# Patient Record
Sex: FEMALE | Race: WHITE | NOT HISPANIC OR LATINO | Employment: OTHER | URBAN - METROPOLITAN AREA
[De-identification: names, ages, dates, MRNs, and addresses within clinical notes are randomized per-mention and may not be internally consistent; named-entity substitution may affect disease eponyms.]

---

## 2017-04-18 ENCOUNTER — TRANSFERRED RECORDS (OUTPATIENT)
Dept: HEALTH INFORMATION MANAGEMENT | Facility: CLINIC | Age: 32
End: 2017-04-18

## 2017-04-20 ENCOUNTER — PRE VISIT (OUTPATIENT)
Dept: MATERNAL FETAL MEDICINE | Facility: CLINIC | Age: 32
End: 2017-04-20

## 2017-04-27 ENCOUNTER — OFFICE VISIT (OUTPATIENT)
Dept: MATERNAL FETAL MEDICINE | Facility: CLINIC | Age: 32
End: 2017-04-27
Attending: OBSTETRICS & GYNECOLOGY
Payer: COMMERCIAL

## 2017-04-27 ENCOUNTER — HOSPITAL ENCOUNTER (OUTPATIENT)
Dept: ULTRASOUND IMAGING | Facility: CLINIC | Age: 32
Discharge: HOME | End: 2017-04-27
Attending: OBSTETRICS & GYNECOLOGY | Admitting: OBSTETRICS & GYNECOLOGY
Payer: COMMERCIAL

## 2017-04-27 DIAGNOSIS — O26.90 PREGNANCY RELATED CONDITION, UNSPECIFIED TRIMESTER: ICD-10-CM

## 2017-04-27 DIAGNOSIS — O09.819 PREGNANCY CONCEIVED THROUGH IN VITRO FERTILIZATION: Primary | ICD-10-CM

## 2017-04-27 PROCEDURE — 76801 OB US < 14 WKS SINGLE FETUS: CPT

## 2017-04-27 PROCEDURE — 96040 ZZH GENETIC COUNSELING, EACH 30 MINUTES: CPT | Mod: ZF | Performed by: GENETIC COUNSELOR, MS

## 2017-04-27 PROCEDURE — 76813 OB US NUCHAL MEAS 1 GEST: CPT

## 2017-04-27 NOTE — PROGRESS NOTES
[facility]  Genetic Counseling Consult    Patient: Hilda Ghosh YOB: 1985   Date of Service: 17      Hilda Ghosh was seen at [facility] [place] for genetic consultation to discuss the options for routine screening for fetal chromosome abnormalities.       Impression/Plan:   1.  Hilda had a nuchal translucency ultrasound today. The biochemical portion of the first trimester screen was not completed as the patient has already had non-invasive prenatal testing through her primary OB provider. Maternal serum AFP (single marker screen) is recommended after 15 weeks to screen for open neural tube defects. A quad screen should not be performed.    Pregnancy History:   /Parity:      Age at Delivery: 32 year old  NE: 2017, by Est. Date of Conception  Gestational Age: 12w4d    No significant complications or exposures were reported in the current pregnancy.    Hilda warner pregnancy history is significant for four miscarriages. Cyrus reported that they had a long history of infertility and opted to use donor embryos. Hilda's first three embryo transfers were from the same donors. The first of those three embryo transfers resulted in a MAB which required a D+C. Karyotype analysis on products of conception indicated trisomy 22. The two subsequent embryo transfers were chemical pregnancies. Cyrus's fourth pregnancy was conceived by intrauterine insemination but resulted in SAB.     This is Hilda's fifth pregnancy. The opted to use a donor embryo (different egg and sperm donor than the first three pregnancies). The egg donor is 34 year old Pakistani female and the sperm donor was a 53 year old  male. This was a frozen embryo transfer with a transfer date of 17. One embryo was transferred a mathias pregnancy was identified by early ultrasound.    Family History:   A family history was  not obtained as this pregnancy was conceived by a donor embryo. Hilda did not report any significant family history reported for the egg donor or sperm donor.           Risk Assessment for Chromosome Conditions:   We explained that the risk for fetal chromosome abnormalities increases with maternal age. We discussed specific features of common chromosome abnormalities, including Down syndrome, trisomy 13, trisomy 18, and sex chromosome trisomies.      At age 32 (age of egg donor), the risk to have a baby with Down syndrome at delivery is 1 in 342.     At age 32 (age of egg donor), the risk to have a baby with any chromosome abnormality at delivery is 1 in  172.     Hilda also had aneuploidy screening earlier in pregnancy.  Non-invasive Prenatal Testing (NIPT)    Maternal plasma cell-free DNA testing    Screens for fetal trisomy 21, trisomy 13, trisomy 18, and sex chromosome aneuploidy    Hilda had an InformaSeq test earlier in pregnancy; we reviewed the results today, which are normal for chromosome 13, chromosome 18 and chromosome 21 (no aneuploidy detected)    Given the accuracy of this test, these results greatly decrease the chance for certain fetal chromosome abnormalities    We discussed the limitations of normal NIPT results      Testing Options:   We discussed the following options:   First trimester screening    First trimester ultrasound with nuchal translucency and nasal bone assessments, maternal plasma hCG, FAN-A, and AFP measurement    Screens for fetal trisomy 21, trisomy 13, and trisomy 18    Cannot screen for open neural tube defects; maternal serum AFP after 15 weeks is recommended    Hilda had a first trimester NT ultrasound only today. The biochemical portion of the first trimester screen was not performed since she had non invasive prenatal testing through her primary OB provider.     Chorionic villus sampling (CVS)    Invasive procedure typically performed in the first trimester by which  placental villi are obtained for the purpose of chromosome analysis and/or other prenatal genetic analysis    Diagnostic results; >99% sensitivity for fetal chromosome abnormalities    Cannot test for open neural tube defects; maternal serum AFP after 15 weeks is recommended     Genetic Amniocentesis    Invasive procedure typically performed in the second trimester by which amniotic fluid is obtained for the purpose of chromosome analysis and/or other prenatal genetic analysis    Diagnostic results; >99% sensitivity for fetal chromosome abnormalities    AFAFP measurement tests for open neural tube defects     Comprehensive (Level II) ultrasound: Detailed ultrasound performed between 18-22 weeks gestation to screen for major birth defects and markers for aneuploidy.    We reviewed the benefits and limitations of this testing.  Screening tests provide a risk assessment specific to the pregnancy for certain fetal chromosome abnormalities, but cannot definitively diagnose or exclude a fetal chromosome abnormality.  Follow-up genetic counseling and consideration of diagnostic testing is recommended with any abnormal screening result.      It was a pleasure to be involved with Hilda timmy hector. Face-to-face time of the meeting was 30 minutes.    Skye Morales MS, [facility]  Maternal Fetal Medicine  [facility]  Phone:[phone]  Email: [email or web address]

## 2017-04-27 NOTE — MR AVS SNAPSHOT
After Visit Summary   4/27/2017    Hilda Ghosh    MRN: [id number]           Patient Information     Date Of Birth          1985        Visit Information        Provider Department      4/27/2017 2:45 PM Dee Stewart,  [facility]etal [facility]        Today's Diagnoses     Pregnancy conceived through in vitro fertilization    -  1       Follow-ups after your visit        Your next 10 appointments already scheduled     Jun 08, 2017  2:15 PM CDT   MFM US COMP with SHMFMUSR1   [facility])    [address] [place] [address]   [phone]           Wear comfortable clothes and leave your valuables at home.            Jun 08, 2017  2:45 PM CDT   Radiology MD with SH SHELLEY SULLIVAN   [facility] Maternal Fetal Medicine [facility])    [address] [place] [address] [phone]           Please arrive at the time given for your first appointment.  This visit is used internally to schedule the physician's time during your ultrasound.            Jun 29, 2017  2:15 PM CDT   M READ SCREEN FETAL EHCO Shen with SHMFMUSR3   [facility] - [facility] ([facility])    [address] [place] [id number] [phone]            Jun 29, 2017  2:45 PM CDT   Radiology MD with SH SHELLEY SULLIVAN   [facility]etal [facility] ([facility])    [address] [place] [id number] [phone]           Please arrive at the time given for your first appointment.  This visit is used internally to schedule the physician's time during your ultrasound.              Future tests that were ordered for you today     Open Future Orders        Priority Expected Expires Ordered    MFM Read Screen Fetal Echo Single Routine  2/27/2018  "2017            Who to contact     If you have questions or need follow up information about today's clinic visit or your schedule please contact [facility] MATERNAL FETAL MEDICINE [facility] directly at [phone].  Normal or non-critical lab and imaging results will be communicated to you by MyChart, letter or phone within 4 business days after the clinic has received the results. If you do not hear from us within 7 days, please contact the clinic through MyChart or phone. If you have a critical or abnormal lab result, we will notify you by phone as soon as possible.  Submit refill requests through [employer] or call your pharmacy and they will forward the refill request to us. Please allow 3 business days for your refill to be completed.          Additional Information About Your Visit        [employer] Information     [employer] lets you send messages to your doctor, view your test results, renew your prescriptions, schedule appointments and more. To sign up, go to [email or web address] . Click on \"Log in\" on the left side of the screen, which will take you to the Welcome page. Then click on \"Sign up Now\" on the right side of the page.     You will be asked to enter the access code listed below, as well as some personal information. Please follow the directions to create your username and password.     Your access code is: [email or web address]  Expires: 2017  4:21 PM     Your access code will  in 90 days. If you need help or a new code, please call your [place] clinic or [phone].        Care EveryWhere ID     This is your Care EveryWhere ID. This could be used by other organizations to access your [place] medical records  [id number]        Your Vitals Were     Last Period                   2017            Blood Pressure from Last 3 Encounters:   16 144/84    Weight from Last 3 Encounters:   16 103.1 kg (227 lb 6.4 oz)               Primary Care Provider Office Phone # Fax #    Osmany " Bi Ledezma [phone]       OB GYN INFERTILITY [address]  [facility] [address]        Thank you!     Thank you for choosing [facility]  for your care. Our goal is always to provide you with excellent care. Hearing back from our patients is one way we can continue to improve our services. Please take a few minutes to complete the written survey that you may receive in the mail after your visit with us. Thank you!             Your Updated Medication List - Protect others around you: Learn how to safely use, store and throw away your medicines at www.disposemymeds.org.          This list is accurate as of: 4/27/17 11:59 PM.  Always use your most recent med list.                   Brand Name Dispense Instructions for use    ADDERALL XR PO      Take 15 mg by mouth daily       CITALOPRAM HYDROBROMIDE PO      Take 40 mg by mouth daily       norethindrone-ethinyl estradiol 0.4-35 MG-MCG per tablet    OVCON    84 tablet    Take 1 tablet by mouth daily       prenatal multivitamin  plus iron 27-0.8 MG Tabs per tablet      Take 1 tablet by mouth daily       traMADol 50 MG tablet    ULTRAM    20 tablet    Take 1-2 tablets ( mg) by mouth every 6 hours as needed for pain maximum 8 tablet(s) per day

## 2017-04-27 NOTE — MR AVS SNAPSHOT
After Visit Summary   4/27/2017    Hilda Ghosh    MRN: [id number]           Patient Information     Date Of Birth          1985        Visit Information        Provider Department      4/27/2017 1:30 PM Skye Morales [facility] Maternal Fetal Medicine [facility]        Today's Diagnoses     Pregnancy related condition, unspecified trimester           Follow-ups after your visit        Your next 10 appointments already scheduled     Jun 08, 2017  2:15 PM CDT   MFM US COMP with SHMFMUSR1   [facility] Maternal Fetal Medicine Ultrasound - [facility] ([facility])    [address] [place] [address]   [phone]           Wear comfortable clothes and leave your valuables at home.            Jun 08, 2017  2:45 PM CDT   Radiology MD with SH SHELLEY SULLIVAN   [facility] Maternal Fetal Medicine [facility] ([facility])    [address] [place] [address] [phone]           Please arrive at the time given for your first appointment.  This visit is used internally to schedule the physician's time during your ultrasound.            Jun 29, 2017  2:15 PM CDT   M READ SCREEN FETAL EHCO Shen with SHMFMUSR3   [facility] Maternal Fetal Medicine Ultrasound - [facility] ([facility])    [address] [place] [address] [phone]            Jun 29, 2017  2:45 PM CDT   Radiology MD with SH SHELLEY SULLIVAN   [facility] Maternal Fetal Medicine [facility] ([facility])    [address] [place] [address] [phone]           Please arrive at the time given for your first appointment.  This visit is used internally to schedule the physician's time during your ultrasound.              Future tests that were ordered for you today     Open Future Orders        Priority Expected Expires Ordered    MFM Read Screen Fetal Echo Single Routine  2/27/2018 4/27/2017        "     Who to contact     If you have questions or need follow up information about today's clinic visit or your schedule please contact [facility] MATERNAL FETAL MEDICINE [facility] directly at [phone].  Normal or non-critical lab and imaging results will be communicated to you by MyChart, letter or phone within 4 business days after the clinic has received the results. If you do not hear from us within 7 days, please contact the clinic through MyChart or phone. If you have a critical or abnormal lab result, we will notify you by phone as soon as possible.  Submit refill requests through [employer] or call your pharmacy and they will forward the refill request to us. Please allow 3 business days for your refill to be completed.          Additional Information About Your Visit        [employer] [facility] Hospital[employer] [employer] Information     [employer] lets you send messages to your doctor, view your test results, renew your prescriptions, schedule appointments and more. To sign up, go to [email or web address] . Click on \"Log in\" on the left side of the screen, which will take you to the Welcome page. Then click on \"Sign up Now\" on the right side of the page.     You will be asked to enter the access code listed below, as well as some personal information. Please follow the directions to create your username and password.     Your access code is: [email or web address]  Expires: 2017  4:21 PM     Your access code will  in 90 days. If you need help or a new code, please call your [place] clinic or [phone].        Care EveryWhere ID     This is your Care EveryWhere ID. This could be used by other organizations to access your [place] medical records  [id number]        Your Vitals Were     Last Period                   2017            Blood Pressure from Last 3 Encounters:   16 144/84    Weight from Last 3 Encounters:   16 103.1 kg (227 lb 6.4 oz)              We Performed the Following     [facility] Genetic Counseling        Primary " Care Provider Office Phone # Fax #    Osmany Ledezma [phone]       OB GYN INFERTILITY [address]  [facility] [address]        Thank you!     Thank you for choosing [facility]  for your care. Our goal is always to provide you with excellent care. Hearing back from our patients is one way we can continue to improve our services. Please take a few minutes to complete the written survey that you may receive in the mail after your visit with us. Thank you!             Your Updated Medication List - Protect others around you: Learn how to safely use, store and throw away your medicines at www.disposemymeds.org.          This list is accurate as of: 4/27/17  4:21 PM.  Always use your most recent med list.                   Brand Name Dispense Instructions for use    ADDERALL XR PO      Take 15 mg by mouth daily       CITALOPRAM HYDROBROMIDE PO      Take 40 mg by mouth daily       norethindrone-ethinyl estradiol 0.4-35 MG-MCG per tablet    OVCON    84 tablet    Take 1 tablet by mouth daily       prenatal multivitamin  plus iron 27-0.8 MG Tabs per tablet      Take 1 tablet by mouth daily       traMADol 50 MG tablet    ULTRAM    20 tablet    Take 1-2 tablets ( mg) by mouth every 6 hours as needed for pain maximum 8 tablet(s) per day

## 2017-04-28 NOTE — PROGRESS NOTES
"Please see \"Imaging\" tab under \"Chart Review\" for details of today's US.    Dee Stewart, DO    "

## 2017-06-08 ENCOUNTER — HOSPITAL ENCOUNTER (OUTPATIENT)
Dept: ULTRASOUND IMAGING | Facility: CLINIC | Age: 32
Discharge: HOME | End: 2017-06-08
Attending: OBSTETRICS & GYNECOLOGY | Admitting: OBSTETRICS & GYNECOLOGY
Payer: COMMERCIAL

## 2017-06-08 ENCOUNTER — OFFICE VISIT (OUTPATIENT)
Dept: MATERNAL FETAL MEDICINE | Facility: CLINIC | Age: 32
End: 2017-06-08
Attending: OBSTETRICS & GYNECOLOGY
Payer: COMMERCIAL

## 2017-06-08 DIAGNOSIS — O09.819 PREGNANCY CONCEIVED THROUGH IN VITRO FERTILIZATION: Primary | ICD-10-CM

## 2017-06-08 DIAGNOSIS — O26.90 PREGNANCY RELATED CONDITION, UNSPECIFIED TRIMESTER: ICD-10-CM

## 2017-06-08 PROCEDURE — 76811 OB US DETAILED SNGL FETUS: CPT

## 2017-06-08 NOTE — MR AVS SNAPSHOT
After Visit Summary   6/8/2017    Hilda Ghosh    MRN: [id number]           Patient Information     Date Of Birth          1985        Visit Information        Provider Department      6/8/2017 2:45 PM Dee Stewart,  [facility] Maternal Fetal Medicine [facility]        Today's Diagnoses     Pregnancy conceived through in vitro fertilization    -  1       Follow-ups after your visit        Your next 10 appointments already scheduled     Jun 29, 2017  1:30 PM CDT   MFM US COMPRE SINGLE F/U with SHMFMUSR3   [facility] Maternal Fetal Medicine Ultrasound - [facility] ([facility])    [address] [place] [address] [address]  [facility] [address] [phone]           Wear comfortable clothes and leave your valuables at home.            Jun 29, 2017  2:15 PM CDT   MFM READ SCREEN FETAL EHCO Shen with SHMFMUSR3   [facility] Maternal Fetal Medicine Ultrasound - [facility] ([facility])    [address] [place] [address] [address]  [place] [address] [phone]            Jun 29, 2017  2:45 PM CDT   Radiology MD with  MFM MD   [facility] Maternal Fetal Medicine [facility] ([facility])    [address] [place] [address] [address]  [place] [id number] [phone]           Please arrive at the time given for your first appointment.  This visit is used internally to schedule the physician's time during your ultrasound.              Future tests that were ordered for you today     Open Future Orders        Priority Expected Expires Ordered    MFM US Comprehensive Single F/U Routine 6/29/2017 6/8/2018 6/8/2017            Who to contact     If you have questions or need follow up information about today's clinic visit or your schedule please contact [facility] MATERNAL FETAL MEDIC[facility] directly at [phone].  Normal or non-critical lab and imaging results will be communicated to you by MyChart, letter or phone within 4 business days after the clinic has  "received the results. If you do not hear from us within 7 days, please contact the clinic through [employer] or phone. If you have a critical or abnormal lab result, we will notify you by phone as soon as possible.  Submit refill requests through [employer] or call your pharmacy and they will forward the refill request to us. Please allow 3 business days for your refill to be completed.          Additional Information About Your Visit        [employer] Information     [employer] lets you send messages to your doctor, view your test results, renew your prescriptions, schedule appointments and more. To sign up, go to [email or web address] . Click on \"Log in\" on the left side of the screen, which will take you to the Welcome page. Then click on \"Sign up Now\" on the right side of the page.     You will be asked to enter the access code listed below, as well as some personal information. Please follow the directions to create your username and password.     Your access code is: [email or web address]  Expires: 2017  4:21 PM     Your access code will  in 90 days. If you need help or a new code, please call your [place] clinic or [phone].        Care EveryWhere ID     This is your Care EveryWhere ID. This could be used by other organizations to access your [place] medical records  [id number]        Your Vitals Were     Last Period                   2017            Blood Pressure from Last 3 Encounters:   16 144/84    Weight from Last 3 Encounters:   16 103.1 kg (227 lb 6.4 oz)               Primary Care Provider Office Phone # Fax #    Osmany Ledezma [phone]       OB GYN INFERTILITY [address]  [facility] [address]        Thank you!     Thank you for choosing [facility]  for your care. Our goal is always to provide you with excellent care. Hearing back from our patients is one way we can continue to improve our services. Please take a few " minutes to complete the written survey that you may receive in the mail after your visit with us. Thank you!             Your Updated Medication List - Protect others around you: Learn how to safely use, store and throw away your medicines at www.disposemymeds.org.          This list is accurate as of: 6/8/17  3:15 PM.  Always use your most recent med list.                   Brand Name Dispense Instructions for use    ADDERALL XR PO      Take 15 mg by mouth daily       CITALOPRAM HYDROBROMIDE PO      Take 40 mg by mouth daily       norethindrone-ethinyl estradiol 0.4-35 MG-MCG per tablet    OVCON    84 tablet    Take 1 tablet by mouth daily       prenatal multivitamin  plus iron 27-0.8 MG Tabs per tablet      Take 1 tablet by mouth daily       traMADol 50 MG tablet    ULTRAM    20 tablet    Take 1-2 tablets ( mg) by mouth every 6 hours as needed for pain maximum 8 tablet(s) per day

## 2017-06-29 ENCOUNTER — HOSPITAL ENCOUNTER (OUTPATIENT)
Dept: ULTRASOUND IMAGING | Facility: CLINIC | Age: 32
Discharge: HOME | End: 2017-06-29
Attending: OBSTETRICS & GYNECOLOGY | Admitting: OBSTETRICS & GYNECOLOGY
Payer: COMMERCIAL

## 2017-06-29 ENCOUNTER — HOSPITAL ENCOUNTER (OUTPATIENT)
Dept: ULTRASOUND IMAGING | Facility: CLINIC | Age: 32
End: 2017-06-29
Attending: OBSTETRICS & GYNECOLOGY
Payer: COMMERCIAL

## 2017-06-29 ENCOUNTER — OFFICE VISIT (OUTPATIENT)
Dept: MATERNAL FETAL MEDICINE | Facility: CLINIC | Age: 32
End: 2017-06-29
Attending: OBSTETRICS & GYNECOLOGY
Payer: COMMERCIAL

## 2017-06-29 DIAGNOSIS — O09.819 PREGNANCY CONCEIVED THROUGH IN VITRO FERTILIZATION: ICD-10-CM

## 2017-06-29 DIAGNOSIS — O09.819 PREGNANCY CONCEIVED THROUGH IN VITRO FERTILIZATION: Primary | ICD-10-CM

## 2017-06-29 PROCEDURE — 76825 ECHO EXAM OF FETAL HEART: CPT

## 2017-06-29 PROCEDURE — 76816 OB US FOLLOW-UP PER FETUS: CPT

## 2017-06-29 NOTE — MR AVS SNAPSHOT
"              After Visit Summary   2017    Hilda Ghosh    MRN: [id number]           Patient Information     Date Of Birth          1985        Visit Information        Provider Department      2017 2:45 PM Dee Stewart, DO [facility] Maternal Fetal Medicine [facility]        Today's Diagnoses     Pregnancy conceived through in vitro fertilization    -  1       Follow-ups after your visit        Who to contact     If you have questions or need follow up information about today's clinic visit or your schedule please contact [facility] MATERNAL FETAL MEDICINE [facility] directly at [phone].  Normal or non-critical lab and imaging results will be communicated to you by PonoMusichart, letter or phone within 4 business days after the clinic has received the results. If you do not hear from us within 7 days, please contact the clinic through Chatalogt or phone. If you have a critical or abnormal lab result, we will notify you by phone as soon as possible.  Submit refill requests through gdgt or call your pharmacy and they will forward the refill request to us. Please allow 3 business days for your refill to be completed.          Additional Information About Your Visit        MyChart Information     gdgt lets you send messages to your doctor, view your test results, renew your prescriptions, schedule appointments and more. To sign up, go to [email or web address] . Click on \"Log in\" on the left side of the screen, which will take you to the Welcome page. Then click on \"Sign up Now\" on the right side of the page.     You will be asked to enter the access code listed below, as well as some personal information. Please follow the directions to create your username and password.     Your access code is: [email or web address]  Expires: 2017  4:21 PM     Your access code will  in 90 days. If you need help or a new code, please call your [place] clinic or [phone].        Care EveryWhere ID     " This is your Care EveryWhere ID. This could be used by other organizations to access your [facility] medical records  [id number]        Your Vitals Were     Last Period                   01/29/2017            Blood Pressure from Last 3 Encounters:   11/23/16 144/84    Weight from Last 3 Encounters:   11/23/16 103.1 kg (227 lb 6.4 oz)              Today, you had the following     No orders found for display       Primary Care Provider Office Phone # Fax #    Osmany Ledezma [phone]       [facility] [address]  [place] [address]        Equal Access to Services     [facility]: Hadii aad ku hadasho Soomaali, waaxda luqadaha, qaybta kaalmada adeegyada, evelyn ribera haykit guillen . So [facility] [phone].    ATENCIÓN: Si habla español, tiene a sommers disposición servicios gratuitos de asistencia lingüística. Llame al [phone].    We comply with applicable federal civil rights laws and [place] laws. We do not discriminate on the basis of race, color, national origin, age, disability sex, sexual orientation or gender identity.            Thank you!     Thank you for choosing [facility]ERNAL FETAL MEDIC[facility]  for your care. Our goal is always to provide you with excellent care. Hearing back from our patients is one way we can continue to improve our services. Please take a few minutes to complete the written survey that you may receive in the mail after your visit with us. Thank you!             Your Updated Medication List - Protect others around you: Learn how to safely use, store and throw away your medicines at www.disposemymeds.org.          This list is accurate as of: 6/29/17  4:54 PM.  Always use your most recent med list.                   Brand Name Dispense Instructions for use Diagnosis    ADDERALL XR PO      Take 15 mg by mouth daily        CITALOPRAM HYDROBROMIDE PO      Take 40 mg by mouth daily        norethindrone-ethinyl estradiol 0.4-35  MG-MCG per tablet    OVCON    84 tablet    Take 1 tablet by mouth daily    Spontaneous miscarriage       prenatal multivitamin  plus iron 27-0.8 MG Tabs per tablet      Take 1 tablet by mouth daily        traMADol 50 MG tablet    ULTRAM    20 tablet    Take 1-2 tablets ( mg) by mouth every 6 hours as needed for pain maximum 8 tablet(s) per day    Acute post-operative pain

## 2017-09-17 ENCOUNTER — HEALTH MAINTENANCE LETTER (OUTPATIENT)
Age: 32
End: 2017-09-17

## 2018-11-18 ENCOUNTER — HOSPITAL ENCOUNTER (EMERGENCY)
Facility: CLINIC | Age: 33
Discharge: HOME | End: 2018-11-18
Attending: EMERGENCY MEDICINE | Admitting: EMERGENCY MEDICINE
Payer: COMMERCIAL

## 2018-11-18 VITALS
BODY MASS INDEX: 31.1 KG/M2 | SYSTOLIC BLOOD PRESSURE: 147 MMHG | TEMPERATURE: 97.6 F | WEIGHT: 210 LBS | OXYGEN SATURATION: 99 % | HEIGHT: 69 IN | DIASTOLIC BLOOD PRESSURE: 93 MMHG | RESPIRATION RATE: 16 BRPM

## 2018-11-18 DIAGNOSIS — R11.2 NON-INTRACTABLE VOMITING WITH NAUSEA, UNSPECIFIED VOMITING TYPE: ICD-10-CM

## 2018-11-18 LAB
ANION GAP SERPL CALCULATED.3IONS-SCNC: 8 MMOL/L (ref 3–14)
BUN SERPL-MCNC: 18 MG/DL (ref 7–30)
CALCIUM SERPL-MCNC: 9.2 MG/DL (ref 8.5–10.1)
CHLORIDE SERPL-SCNC: 105 MMOL/L (ref 94–109)
CO2 SERPL-SCNC: 24 MMOL/L (ref 20–32)
CREAT SERPL-MCNC: 0.56 MG/DL (ref 0.52–1.04)
GFR SERPL CREATININE-BSD FRML MDRD: >90 ML/MIN/1.7M2
GLUCOSE SERPL-MCNC: 142 MG/DL (ref 70–99)
HCG SERPL QL: NEGATIVE
MAGNESIUM SERPL-MCNC: 1.8 MG/DL (ref 1.6–2.3)
POTASSIUM SERPL-SCNC: 3.9 MMOL/L (ref 3.4–5.3)
SODIUM SERPL-SCNC: 137 MMOL/L (ref 133–144)

## 2018-11-18 PROCEDURE — 99284 EMERGENCY DEPT VISIT MOD MDM: CPT | Mod: 25

## 2018-11-18 PROCEDURE — 80048 BASIC METABOLIC PNL TOTAL CA: CPT | Performed by: EMERGENCY MEDICINE

## 2018-11-18 PROCEDURE — 83735 ASSAY OF MAGNESIUM: CPT | Performed by: EMERGENCY MEDICINE

## 2018-11-18 PROCEDURE — 96374 THER/PROPH/DIAG INJ IV PUSH: CPT

## 2018-11-18 PROCEDURE — 84703 CHORIONIC GONADOTROPIN ASSAY: CPT | Performed by: EMERGENCY MEDICINE

## 2018-11-18 PROCEDURE — 96361 HYDRATE IV INFUSION ADD-ON: CPT

## 2018-11-18 PROCEDURE — 25000128 H RX IP 250 OP 636: Performed by: EMERGENCY MEDICINE

## 2018-11-18 RX ORDER — ONDANSETRON 2 MG/ML
4 INJECTION INTRAMUSCULAR; INTRAVENOUS ONCE
Status: COMPLETED | OUTPATIENT
Start: 2018-11-18 | End: 2018-11-18

## 2018-11-18 RX ORDER — ONDANSETRON 4 MG/1
4 TABLET, ORALLY DISINTEGRATING ORAL EVERY 8 HOURS PRN
Qty: 10 TABLET | Refills: 0 | Status: SHIPPED | OUTPATIENT
Start: 2018-11-18 | End: 2018-11-21

## 2018-11-18 RX ADMIN — ONDANSETRON 4 MG: 2 INJECTION INTRAMUSCULAR; INTRAVENOUS at 14:51

## 2018-11-18 RX ADMIN — SODIUM CHLORIDE 1000 ML: 9 INJECTION, SOLUTION INTRAVENOUS at 14:51

## 2018-11-18 ASSESSMENT — ENCOUNTER SYMPTOMS
CONSTIPATION: 0
VOMITING: 1
DYSURIA: 0
CHILLS: 0
DIARRHEA: 1
ABDOMINAL PAIN: 1
BACK PAIN: 0
NAUSEA: 1
FEVER: 0

## 2018-11-18 NOTE — ED AVS SNAPSHOT
[facility] Emergency Department    [address]    [facility] [address]    Phone:  [phone]    Fax:  [phone]                                       Hilda Ghosh   MRN: [id number]    Department:  [facility] Emergency Department   Date of Visit:  11/18/2018           After Visit Summary Signature Page     I have received my discharge instructions, and my questions have been answered. I have discussed any challenges I see with this plan with the nurse or doctor.    ..........................................................................................................................................  Patient/Patient Representative Signature      ..........................................................................................................................................  Patient Representative Print Name and Relationship to Patient    ..................................................               ................................................  Date                                   Time    ..........................................................................................................................................  Reviewed by Signature/Title    ...................................................              ..............................................  Date                                               Time          22EPIC Rev 08/18

## 2018-11-18 NOTE — ED TRIAGE NOTES
Pt presents for complaint of 24 hours of nausea, vomiting and diarrhea. States she has been having some abdominal cramping with this. Denies anybody else at home being sick. ABC intact, A&Ox4.

## 2018-11-18 NOTE — ED PROVIDER NOTES
"  History     Chief Complaint:  \"I have been vomiting for the past 24 hours.\"    HPI   Hilda Ghosh is a 33 year old female who presents with nausea and vomiting. The patient states that yesterday she enjoyed a \"friendsgiving\" with several close friends and her significant other. They all ate similar foods but around 24 hour ago the patient reports that she had a fairly sudden onset of nausea and vomiting followed by diffuse abdominal cramping. She has no outright diarrhea but has some looser stools. She has been unable to keep anything down at home due to symptoms and presents here for evaluation. The patient denies abdominal pain preceding her nausea and vomiting. She denies fevers, chills, bloody stools, urinary symptoms, flank pain.    LMP: 3 weeks ago    Allergies:  No known drug allergies     Medications:    Lamictal  Propranolol  Adderall     Past Medical History:    Anxiety  Depression   Fibroid    Past Surgical History:      D&C   Left finger reattachment surgery    Family History:    History reviewed. No pertinent family history.      Social History:  Smoking Status: Former Smoker  Alcohol Use: Yes  Marital Status:        Review of Systems   Constitutional: Negative for chills and fever.   Gastrointestinal: Positive for abdominal pain, diarrhea (looser stools), nausea and vomiting. Negative for constipation.   Genitourinary: Negative for dysuria, vaginal bleeding and vaginal discharge.   Musculoskeletal: Negative for back pain.   All other systems reviewed and are negative.      Physical Exam   First Vitals:  BP: (!) 167/106  Heart Rate: 83  Temp: 97.6  F (36.4  C)  Resp: 16  Height: 175.3 cm (5' 9\")  Weight: 95.3 kg (210 lb)  SpO2: 98 %    Physical Exam  Nursing note and vitals reviewed.  Constitutional: Well nourished. Holding emesis bag at bedside.    Eyes: Conjunctiva normal.  Pupils are equal, round, and reactive to light.   ENT: Nose normal. Mucous membranes pink and moist. Posterior " oropharynx normal.    Neck: Normal range of motion.  CVS: Normal rate, regular rhythm.  Normal heart sounds.  No murmur.  Pulmonary: Lungs clear to auscultation bilaterally. No wheezes/rales/rhonchi.  GI: Abdomen soft. Nontender, nondistended. No rigidity or guarding.    MSK: No calf tenderness or swelling.  Neuro: Alert. Follows simple commands.  Skin: Skin is warm and dry. No rash noted.   Psychiatric: Normal affect.       Emergency Department Course     Laboratory:  BMP: Glucose 142 (H), otherwise WNL (Creatinine 0.56)   HCG: Negative  Magnesium: 1.8     Interventions:  1451 - Zofran 4mg IV injection    1451 - NS 1L IV Bolus     Emergency Department Course:  Past medical records, nursing notes, and vitals reviewed.  1426: I performed an exam of the patient and obtained history, as documented above.     An IV was inserted to administer the above interventions.      1553: I rechecked the patient. Findings and plan explained to the Patient. Patient discharged home with instructions regarding supportive care, medications, and reasons to return. The importance of close follow-up was reviewed.      Impression & Plan      Medical Decision Making:  Hilda Ghosh is a 33 year old female who presents for evaluation of nausea, vomiting and diarrhea with minimal abdominal pain in a nonfocal abdominal exam. The differential diagnosis of vomiting and diarrhea is broad and includes such etiologies as viral gastroenteritis, bacterial infection of the large intestine (salmonella, shigella, campylobacter, E. coli, etc), bowel obstruction, intra-abdominal infection such as colitis, cholecystitis, UTI, pyelonephritis, appendicitis, etc.  There are no signs of worrisome intra-abdominal pathologies detected during the visit today.  The patient is not pregnant.  The patient has a benign abdominal exam without rebound, guarding, or marked tenderness to palpation.  Laboratory studies were unremarkable.  The patient improved with IV fluids  and anti-emetics; tolerating PO challenge. Supportive outpatient management seems appropriate at this time.  Abdominal pain precautions given. No indication for stool studies or CT at this time.  It was discussed with the patient to return to the ED for blood in stool, increasing pain, or fevers more than 102F.        Diagnosis:    ICD-10-CM    1. Non-intractable vomiting with nausea, unspecified vomiting type R11.2      Discharge Medications:  New Prescriptions    ONDANSETRON (ZOFRAN ODT) 4 MG ODT TAB    Take 1 tablet (4 mg) by mouth every 8 hours as needed for nausea       Clinton Mcadams  11/18/2018   [facility] EMERGENCY DEPARTMENT  IClinton, malaika serving as a scribe at 2:27 PM on 11/18/2018 to document services personally performed by Milagros Leone MD based on my observations and the provider's statements to me.       Milagros Leone MD  11/18/18 1603

## 2018-11-18 NOTE — ED AVS SNAPSHOT
[facility] Emergency Department    [address] [place] [address]    Phone:  [phone]    Fax:  [phone]                                       Hilda Ghosh   MRN: [id number]    Department:  [facility] Emergency Department   Date of Visit:  11/18/2018           Patient Information     Date Of Birth          1985        Your diagnoses for this visit were:     Non-intractable vomiting with nausea, unspecified vomiting type        You were seen by Milagros Leone MD.      Follow-up Information     Follow up with Angelica Rubin MD. Schedule an appointment as soon as possible for a visit in 2 days.    Specialty:  OB/Gyn    Contact information:    [facility]  [address] [place] [address]  [phone]          Discharge Instructions       Discharge Instructions  Vomiting    You have been seen today for vomiting (throwing up). This is usually caused by a virus, but some bacteria, parasites, medicines or other medical conditions can cause similar symptoms. At this time your provider does not find that your vomiting is a sign of anything dangerous or life-threatening. However, sometimes the signs of serious illness do not show up right away. If you have new or worse symptoms, you may need to be seen again in the Emergency Department or by your primary provider. Remember that serious problems like appendicitis can start as vomiting.    Generally, every Emergency Department visit should have a follow-up clinic visit with either a primary or a specialty clinic/provider. Please follow-up as instructed by your emergency provider today.    Return to the Emergency Department if:    You keep vomiting and you are not able to keep liquids down.     You feel you are getting dehydrated, such as being very thirsty, not urinating (peeing) at least every 8-12 hours, or feeling faint or lightheaded.     You develop a new fever, or your fever continues  for more than 2 days.     You have abdominal (belly pain) that seems worse than cramps, is in one spot, or is getting worse over time. Appendicitis usually causes pain in the right lower abdomen (to the right and below your belly button) so watch for pain in this location.    You have blood in your vomit or stools.     You feel very weak.    You are not starting to improve within 24 hours of your visit here.     What can I do to help myself?    The most important thing to do is to drink clear liquids. If you have been vomiting a lot, it is best to have only small, frequent sips of liquids. Drinking too much at once may cause more vomiting. If you are vomiting often, you must replace minerals, sodium and potassium lost with your illness. Pedialyte  is the best available rehydration liquid but some find that it doesn t taste good so sports drinks are an alterative. You can also drink clear liquids such as water, weak tea, apple juice, and 7-Up . Avoid acid liquids (orange), caffeine (coffee) or alcohol. Do not drink milk until you no longer have diarrhea (loose stools).     After liquids are staying down, you may start eating mild foods. Soda crackers, toast, plain noodles, gelatin, applesauce and bananas are good first choices. Avoid foods that have acid, are spicy, fatty or have a lot of fiber (such as meats, coarse grains, vegetables). You may start eating these foods again in about 3 days when you are better.     Sometimes treatment includes prescription medicine to prevent nausea (sick to your stomach) and vomiting. If your provider prescribes these for you, take them as directed.     Do not take ibuprofen, naproxen, or other nonsteroidal anti-inflammatory (NSAID) medicines without checking with your healthcare provider.     If you were given a prescription for medicine here today, be sure to read all of the information (including the package insert) that comes with your prescription.  This will include important  information about the medicine, its side effects, and any warnings that you need to know about.  The pharmacist who fills the prescription can provide more information and answer questions you may have about the medicine.  If you have questions or concerns that the pharmacist cannot address, please call or return to the Emergency Department.     Remember that you can always come back to the Emergency Department if you are not able to see your regular provider in the amount of time listed above, if you get any new symptoms, or if there is anything that worries you.      24 Hour Appointment Hotline       To make an appointment at any [facility]3], call [phone] ([phone]). If you don't have a family doctor or clinic, we will help you find one. [facility] clinics are conveniently located to serve the needs of you and your family.             Review of your medicines      START taking        Dose / Directions Last dose taken    ondansetron 4 MG ODT tab   Commonly known as:  ZOFRAN ODT   Dose:  4 mg   Quantity:  10 tablet        Take 1 tablet (4 mg) by mouth every 8 hours as needed for nausea   Refills:  0          Our records show that you are taking the medicines listed below. If these are incorrect, please call your family doctor or clinic.        Dose / Directions Last dose taken    ADDERALL XR PO   Dose:  15 mg        Take 15 mg by mouth daily   Refills:  0        LAMICTAL PO   Dose:  200 mg        Take 200 mg by mouth daily   Refills:  0        PROPRANOLOL HCL PO        Refills:  0                Prescriptions were sent or printed at these locations (1 Prescription)                   Other Prescriptions                Printed at Department/Unit printer (1 of 1)         ondansetron (ZOFRAN ODT) 4 MG ODT tab                Procedures and tests performed during your visit     Basic metabolic panel    HCG QUALitative pregnancy (blood)    Magnesium      Orders Needing Specimen Collection     None       Pending Results     No orders found from 11/16/2018 to 11/19/2018.            Pending Culture Results     No orders found from 11/16/2018 to 11/19/2018.            Pending Results Instructions     If you had any lab results that were not finalized at the time of your Discharge, you can call the ED Lab Result RN at [phone]. You will be contacted by this team for any positive Lab results or changes in treatment. The nurses are available 7 days a week from 10A to 6:30P.  You can leave a message 24 hours per day and they will return your call.        Test Results From Your Hospital Stay        11/18/2018  3:27 PM      Component Results     Component Value Ref Range & Units Status    Sodium 137 133 - 144 mmol/L Final    Potassium 3.9 3.4 - 5.3 mmol/L Final    Chloride 105 94 - 109 mmol/L Final    Carbon Dioxide 24 20 - 32 mmol/L Final    Anion Gap 8 3 - 14 mmol/L Final    Glucose 142 (H) 70 - 99 mg/dL Final    Urea Nitrogen 18 7 - 30 mg/dL Final    Creatinine 0.56 0.52 - 1.04 mg/dL Final    GFR Estimate >90 >60 mL/min/1.7m2 Final    Non  GFR Calc    GFR Estimate If Black >90 >60 mL/min/1.7m2 Final    African American GFR Calc    Calcium 9.2 8.5 - 10.1 mg/dL Final         11/18/2018  3:38 PM      Component Results     Component Value Ref Range & Units Status    HCG Qualitative Serum Negative NEG^Negative Final    This test is for screening purposes.  Results should be interpreted along with   the clinical picture.  Confirmation testing is available if warranted by   ordering RMM160, HCG Quantitative Pregnancy.           11/18/2018  3:27 PM      Component Results     Component Value Ref Range & Units Status    Magnesium 1.8 1.6 - 2.3 mg/dL Final                Clinical Quality Measure: Blood Pressure Screening     Your blood pressure was checked while you were in the emergency department today. The last reading we obtained was  BP: (!) 147/93 . Please read the guidelines below about what these  "numbers mean and what you should do about them.  If your systolic blood pressure (the top number) is less than 120 and your diastolic blood pressure (the bottom number) is less than 80, then your blood pressure is normal. There is nothing more that you need to do about it.  If your systolic blood pressure (the top number) is 120-139 or your diastolic blood pressure (the bottom number) is 80-89, your blood pressure may be higher than it should be. You should have your blood pressure rechecked within a year by a primary care provider.  If your systolic blood pressure (the top number) is 140 or greater or your diastolic blood pressure (the bottom number) is 90 or greater, you may have high blood pressure. High blood pressure is treatable, but if left untreated over time it can put you at risk for heart attack, stroke, or kidney failure. You should have your blood pressure rechecked by a primary care provider within the next 4 weeks.  If your provider in the emergency department today gave you specific instructions to follow-up with your doctor or provider even sooner than that, you should follow that instruction and not wait for up to 4 weeks for your follow-up visit.        Thank you for choosing [facility]       Thank you for choosing [facility] for your care. Our goal is always to provide you with excellent care. Hearing back from our patients is one way we can continue to improve our services. Please take a few minutes to complete the written survey that you may receive in the mail after you visit with us. Thank you!        [email or web address] Information     Flypad lets you send messages to your doctor, view your test results, renew your prescriptions, schedule appointments and more. To sign up, go to [email or web address] . Click on \"Log in\" on the left side of the screen, which will take you to the Welcome page. Then click on \"Sign up Now\" on the right side of the page.     You will be asked to enter the access code listed " below, as well as some personal information. Please follow the directions to create your username and password.     Your access code is: [email or web address]  Expires: 2019  4:04 PM     Your access code will  in 90 days. If you need help or a new code, please call your [place] clinic or [phone].        Care EveryWhere ID     This is your Care EveryWhere ID. This could be used by other organizations to access your [facility] medical records  [id number]        Equal Access to Services     MAG[facility] JULIAN : Nav lou Soaman, wadeshaun lumala, qahollie kaalmamaci acosta, evelyn guillen . So [facility] [phone].    ATENCIÓN: Si habla español, tiene a sommers disposición servicios gratuitos de asistencia lingüística. Llame al [phone].    We comply with applicable federal civil rights laws and [place] laws. We do not discriminate on the basis of race, color, national origin, age, disability, sex, sexual orientation, or gender identity.            After Visit Summary       This is your record. Keep this with you and show to your community pharmacist(s) and doctor(s) at your next visit.

## 2018-11-26 ENCOUNTER — HOSPITAL ENCOUNTER (EMERGENCY)
Facility: CLINIC | Age: 33
Discharge: HOME | End: 2018-11-26
Attending: EMERGENCY MEDICINE | Admitting: EMERGENCY MEDICINE
Payer: COMMERCIAL

## 2018-11-26 VITALS
HEART RATE: 82 BPM | SYSTOLIC BLOOD PRESSURE: 144 MMHG | OXYGEN SATURATION: 97 % | DIASTOLIC BLOOD PRESSURE: 98 MMHG | TEMPERATURE: 98 F | RESPIRATION RATE: 16 BRPM

## 2018-11-26 DIAGNOSIS — R45.86 LABILE MOOD: ICD-10-CM

## 2018-11-26 DIAGNOSIS — F32.A DEPRESSION, UNSPECIFIED DEPRESSION TYPE: ICD-10-CM

## 2018-11-26 PROCEDURE — 90791 PSYCH DIAGNOSTIC EVALUATION: CPT

## 2018-11-26 PROCEDURE — 99285 EMERGENCY DEPT VISIT HI MDM: CPT | Mod: 25

## 2018-11-26 ASSESSMENT — ENCOUNTER SYMPTOMS
NERVOUS/ANXIOUS: 1
ACTIVITY CHANGE: 0
AGITATION: 0
HALLUCINATIONS: 0
CONFUSION: 0
SLEEP DISTURBANCE: 0
APPETITE CHANGE: 0

## 2018-11-26 NOTE — LETTER
11/26/18      To Whom it may concern:    __Irvin Ghosh_______________________ was in our Emergency Department today, 11/26/18. with a patient who needed their assistance.  Please excuse them from work/school.      Sincerely,

## 2018-11-26 NOTE — DISCHARGE INSTRUCTIONS
Depression  Depression is one of the most common mental health problems today. It is not just a state of unhappiness or sadness. It is a true disease. The cause seems to be related to a decrease in chemicals that transmit signals in the brain. Having a family history of depression, alcoholism, or suicide increases the risk. Chronic illness, chronic pain, migraine headaches, and high emotional stress also increase the risk.  Depression is something we tend to recognize in others, but may have a hard time seeing in ourselves. It can show in many physical and emotional ways:    Loss of appetite    Overeating    Not being able to sleep    Sleeping too much    Tiredness not related to physical exertion    Restlessness or irritability    Slowness of movement or speech    Feeling depressed or withdrawn    Loss of interest in things you once enjoyed    Trouble concentrating, poor memory, trouble making decisions    Thoughts of harming or killing oneself, or thoughts that life is not worth living    Low self-esteem  The treatment for depression may include both medicine and psychotherapy. Antidepressants can reduce suffering and can improve the ability to function during the depressed period. Therapy can offer emotional support and help you understand emotional factors that may be causing the depression.  Home care    Ongoing care and support help people manage this disease. Find a healthcare provider and therapist who meet your needs. Seek help when you feel like you may be getting ill.    Be kind to yourself. Make it a point to do things that you enjoy (gardening, walking in nature, going to a movie). Reward yourself for small successes.    Take care of your physical body. Eat a balanced diet (low in saturated fat and high in fruits and vegetables). Exercise at least 3 times a week for 30 minutes. Even mild-moderate exercise (like brisk walking) can make you feel better.    Don't drink alcohol, which can make depression  worse.    Take medicine as prescribed.    Tell each of your healthcare providers about all of the prescription and over-the-counter medicines, vitamins, and supplements you take. Certain supplements interact with medicines and can result in dangerous side effects. Ask your pharmacist when you have questions about medicine interactions.    Talk with your family and trusted friends about your feelings and thoughts. Ask them to help you recognize behavior changes early so you can get help and, if needed, medicine can be adjusted.  Follow-up care  Follow up with your healthcare provider, or as advised.  Call 911  Call 911 if you:    Have suicidal thoughts, a suicide plan, and the means to carry out the plan; or serious thoughts of hurting someone else     Have trouble breathing    Are very confused    Feel very drowsy or have trouble awakening    Faint or lose consciousness    Have new chest pain that becomes more severe, lasts longer, or spreads into your shoulder, arm, neck, jaw, or back  When to seek medical advice  Call your healthcare provider right away if any of these happen:    Feeling extreme depression, fear, anxiety, or anger toward yourself or others    Feeling out of control    Feeling that you may try to harm yourself or another    Hearing voices that others do not hear    Seeing things that others do not see    Can t sleep or eat for 3 days in a row    Friends or family express concern over your behavior and ask you to seek help  Date Last Reviewed: 10/1/2017    1243-2914 [employer]. [address] [place] [address], [place] [address]. All rights reserved. This information is not intended as a substitute for professional medical care. Always follow your healthcare professional's instructions.

## 2018-11-26 NOTE — ED AVS SNAPSHOT
[facility] Emergency Department    [address] [place] [address]    Phone:  [phone]    Fax:  [phone]                                       Hilda Ghosh   MRN: [id number]    Department:  [facility] Emergency Department   Date of Visit:  11/26/2018           Patient Information     Date Of Birth          1985        Your diagnoses for this visit were:     Depression, unspecified depression type     Labile mood        You were seen by Jony Ren MD and Baron Alberto MD.      Follow-up Information     Follow up with Florentin and Assya.    Why:  as scheduled tomorrow        Discharge Instructions         Depression  Depression is one of the most common mental health problems today. It is not just a state of unhappiness or sadness. It is a true disease. The cause seems to be related to a decrease in chemicals that transmit signals in the brain. Having a family history of depression, alcoholism, or suicide increases the risk. Chronic illness, chronic pain, migraine headaches, and high emotional stress also increase the risk.  Depression is something we tend to recognize in others, but may have a hard time seeing in ourselves. It can show in many physical and emotional ways:    Loss of appetite    Overeating    Not being able to sleep    Sleeping too much    Tiredness not related to physical exertion    Restlessness or irritability    Slowness of movement or speech    Feeling depressed or withdrawn    Loss of interest in things you once enjoyed    Trouble concentrating, poor memory, trouble making decisions    Thoughts of harming or killing oneself, or thoughts that life is not worth living    Low self-esteem  The treatment for depression may include both medicine and psychotherapy. Antidepressants can reduce suffering and can improve the ability to function during the depressed period. Therapy can offer emotional support and help you  understand emotional factors that may be causing the depression.  Home care    Ongoing care and support help people manage this disease. Find a healthcare provider and therapist who meet your needs. Seek help when you feel like you may be getting ill.    Be kind to yourself. Make it a point to do things that you enjoy (gardening, walking in nature, going to a movie). Reward yourself for small successes.    Take care of your physical body. Eat a balanced diet (low in saturated fat and high in fruits and vegetables). Exercise at least 3 times a week for 30 minutes. Even mild-moderate exercise (like brisk walking) can make you feel better.    Don't drink alcohol, which can make depression worse.    Take medicine as prescribed.    Tell each of your healthcare providers about all of the prescription and over-the-counter medicines, vitamins, and supplements you take. Certain supplements interact with medicines and can result in dangerous side effects. Ask your pharmacist when you have questions about medicine interactions.    Talk with your family and trusted friends about your feelings and thoughts. Ask them to help you recognize behavior changes early so you can get help and, if needed, medicine can be adjusted.  Follow-up care  Follow up with your healthcare provider, or as advised.  Call 911  Call 911 if you:    Have suicidal thoughts, a suicide plan, and the means to carry out the plan; or serious thoughts of hurting someone else     Have trouble breathing    Are very confused    Feel very drowsy or have trouble awakening    Faint or lose consciousness    Have new chest pain that becomes more severe, lasts longer, or spreads into your shoulder, arm, neck, jaw, or back  When to seek medical advice  Call your healthcare provider right away if any of these happen:    Feeling extreme depression, fear, anxiety, or anger toward yourself or others    Feeling out of control    Feeling that you may try to harm yourself or  another    Hearing voices that others do not hear    Seeing things that others do not see    Can t sleep or eat for 3 days in a row    Friends or family express concern over your behavior and ask you to seek help  Date Last Reviewed: 10/1/2017    3770-1905 [employer]. [address] [place] [address]. All rights reserved. This information is not intended as a substitute for professional medical care. Always follow your healthcare professional's instructions.          Discharge References/Attachments     SUICIDE, RECOGNIZING WARNING SIGNS IN YOURSELF (ENGLISH)      24 Hour Appointment Hotline       To make an appointment at any [facility], call [phone] ([phone]). If you don't have a family doctor or clinic, we will help you find one. [facility] are conveniently located to serve the needs of you and your family.             Review of your medicines      Our records show that you are taking the medicines listed below. If these are incorrect, please call your family doctor or clinic.        Dose / Directions Last dose taken    ADDERALL XR PO   Dose:  15 mg        Take 15 mg by mouth daily   Refills:  0        LAMICTAL PO   Dose:  200 mg        Take 200 mg by mouth daily   Refills:  0        PROPRANOLOL HCL PO        Refills:  0                Orders Needing Specimen Collection     None      Pending Results     No orders found from 11/24/2018 to 11/27/2018.            Pending Culture Results     No orders found from 11/24/2018 to 11/27/2018.            Pending Results Instructions     If you had any lab results that were not finalized at the time of your Discharge, you can call the ED Lab Result RN at [phone]. You will be contacted by this team for any positive Lab results or changes in treatment. The nurses are available 7 days a week from 10A to 6:30P.  You can leave a message 24 hours per day and they will return your call.        Test Results From Your Hospital  Stay               Clinical Quality Measure: Blood Pressure Screening     Your blood pressure was checked while you were in the emergency department today. The last reading we obtained was  BP: (!) 146/101 . Please read the guidelines below about what these numbers mean and what you should do about them.  If your systolic blood pressure (the top number) is less than 120 and your diastolic blood pressure (the bottom number) is less than 80, then your blood pressure is normal. There is nothing more that you need to do about it.  If your systolic blood pressure (the top number) is 120-139 or your diastolic blood pressure (the bottom number) is 80-89, your blood pressure may be higher than it should be. You should have your blood pressure rechecked within a year by a primary care provider.  If your systolic blood pressure (the top number) is 140 or greater or your diastolic blood pressure (the bottom number) is 90 or greater, you may have high blood pressure. High blood pressure is treatable, but if left untreated over time it can put you at risk for heart attack, stroke, or kidney failure. You should have your blood pressure rechecked by a primary care provider within the next 4 weeks.  If your provider in the emergency department today gave you specific instructions to follow-up with your doctor or provider even sooner than that, you should follow that instruction and not wait for up to 4 weeks for your follow-up visit.        Thank you for choosing [facility]       Thank you for choosing [facility] for your care. Our goal is always to provide you with excellent care. Hearing back from our patients is one way we can continue to improve our services. Please take a few minutes to complete the written survey that you may receive in the mail after you visit with us. Thank you!        [employer] Information     Michael Bieker lets you send messages to your doctor, view your test results, renew your prescriptions, schedule appointments  "and more. To sign up, go to [email or web address] . Click on \"Log in\" on the left side of the screen, which will take you to the Welcome page. Then click on \"Sign up Now\" on the right side of the page.     You will be asked to enter the access code listed below, as well as some personal information. Please follow the directions to create your username and password.     Your access code is: [email or web address]  Expires: 2019  4:04 PM     Your access code will  in 90 days. If you need help or a new code, please call your [facility] clinic or [phone].        Care EveryWhere ID     This is your Care EveryWhere ID. This could be used by other organizations to access your [facility] medical records  [id number]        Equal Access to Services     MAGO JULIAN : Nav Mittal, mikie king, gwyn acosta, evelyn mcfadden. So [facility] [phone].    ATENCIÓN: Si habla español, tiene a sommers disposición servicios gratuitos de asistencia lingüística. Llame al [phone].    We comply with applicable federal civil rights laws and [place] laws. We do not discriminate on the basis of race, color, national origin, age, disability, sex, sexual orientation, or gender identity.            After Visit Summary       This is your record. Keep this with you and show to your community pharmacist(s) and doctor(s) at your next visit.                  "

## 2018-11-26 NOTE — ED TRIAGE NOTES
Patient presents to the ED stating she is having thoughts of wanting to kill herself. States she is not safe at home. States has had some psych meds adjusted, but continues to feel suicidal.

## 2018-11-26 NOTE — ED AVS SNAPSHOT
[facility] Emergency Department    [address]    [facility] [address]    Phone:  [phone]    Fax:  [phone]                                       Hilda Ghosh   MRN: [id number]    Department:  [facility] Emergency Department   Date of Visit:  11/26/2018           After Visit Summary Signature Page     I have received my discharge instructions, and my questions have been answered. I have discussed any challenges I see with this plan with the nurse or doctor.    ..........................................................................................................................................  Patient/Patient Representative Signature      ..........................................................................................................................................  Patient Representative Print Name and Relationship to Patient    ..................................................               ................................................  Date                                   Time    ..........................................................................................................................................  Reviewed by Signature/Title    ...................................................              ..............................................  Date                                               Time          22EPIC Rev 08/18

## 2018-11-26 NOTE — ED PROVIDER NOTES
"  History     Chief Complaint:  Suicidal ideation    HPI   Hilda Ghosh is a 33 year old female with a history of Bipolar II, anxiety, and depression, who presents with concern for suicidal ideation. The patient reports that she has a 13 month old at home, and that she has tried many times previously to become pregnancy and has an extensive history of miscarriages. However, since she became pregnant she was switched off her long term serotonin specific reuptake inhibitor which was managing her depression. She states that post partum, she has had daily labile moods, and periods of crying in which she does not have rational thoughts. She states that sometimes these involve thoughts of suicide, though she states she \"would never do it.\" She also has thoughts that she is possibly not a good mother. She states that she presented once early on post-partum and was encouraged to stay inpatient although she declined at that time, instead opting to join a program for new mothers at [facility]. She states that the head of this program is her psychiatrist. She was placed on Lamotrigine back in July, and she was increased from 100 mg to 200 mg one month ago. She notes that since this increase in her dosage, her episodes of irrational thought have become more frequent to the point where she states she can no longer ignore them. Today, she was having an episode of irrationality, as she terms it, and her  told her that she was not a good mother, which she states excalated her state, and caused her to have what she believes was a panic attack. She states that at this time she began scratching her arms and legs and striking her head with her hand.  She states she has never before attempted to harm herself, and she denies any alcohol, drug use.     Allergies:  NKDA    Medications:    Adderall  Lamotrigine  Propranolol    Past Medical History:    Fibroid  Depression  Anxiety    Past Surgical History:    Gyn surgery  Finger " reattachment, left ring  D&C   C section    Family History:    No past pertinent family history.    Social History:  Former smoker  No alc since pregnancy    Review of Systems   Constitutional: Negative for activity change and appetite change.   Psychiatric/Behavioral: Positive for self-injury and suicidal ideas. Negative for agitation, confusion, hallucinations and sleep disturbance. The patient is nervous/anxious.        Physical Exam     Patient Vitals for the past 24 hrs:   BP Temp Pulse Resp SpO2   11/26/18 1345 (!) 179/130 98.2  F (36.8  C) 72 18 98 %         Physical Exam    Vital signs and nursing notes reviewed.     Constitutional: laying on gurney appears tearful  HENT: Oropharynx is clear and moist  Eyes: Conjunctivae are normal bilaterally. Pupils equal  Neck: normal range of motion  Cardiovascular: Normal rate, regular rhythm, normal heart sounds.   Pulmonary/Chest: Effort normal and breath sounds normal. No respiratory distress.   Musculoskeletal: No joint swelling or edema.   Neurological: Alert and oriented. No focal weakness  Skin: Skin is warm and dry. No rash noted.   Psych: tearful, no agitation or hallucinations, good judgement, denies suicidal plan      Emergency Department Course     Nursing notes and vitals reviewed. (2578) I performed an exam of the patient as documented above.     Signed out to Dr. Alberto.     Impression & Plan      Medical Decision Making:  Davina is a 33 year old female who presents after thoughts of self harm and irrational thoughts at home, after she argued with her . Patient states she has had labile moods, more frequently of late, and she does see an outpatient group for post-partum depression at [facility]. patient denies that she did anything to intentionally harm herself other than some superficial scratches on her harms and did not overdose or have any specific plan to hurt herself at this time.   I will have patient undergo [facility] evaluation for further  assessment. She has been cooperative here in the ED. She does have an appointment with [facility] tomorrow for reevaluation and to discuss adjusting her medications potentially, however I would like assessment by [facility] to ensure the patient was able to contract for safety and that there is no clear indication she needs admission to hospital I discussed case with my partner DR. Alberto to follow up with [facility] evaluation to ensure appropriate disposition.       Diagnosis:    ICD-10-CM    1. Depression, unspecified depression type F32.9    2. Labile mood R45.86        Disposition:  Signed out to Dr. Alberto.     Scribe Disclosure:  I, Shmuel Cisneros, am serving as a scribe on 11/26/2018 at 1:52 PM to personally document services performed by Jony Ren MD based on my observations and the provider's statements to me.       Shmuel Cisneros  11/26/2018   [facility] EMERGENCY DEPARTMENT       Jony Ren MD  11/26/18 2013

## 2018-11-27 ENCOUNTER — PATIENT OUTREACH (OUTPATIENT)
Dept: CARE COORDINATION | Facility: CLINIC | Age: 33
End: 2018-11-27

## 2018-11-27 NOTE — ED PROVIDER NOTES
Emergency department signout note    33-year-old female seen on the day shift by Dr. Larson.  She was signed over to me at shift change pending that an evaluation by our counselor, [facility].    Patient was evaluated by Ena from [facility].  Ena feels the patient is safe for discharge home.  Patient has a known history of depression, possibly bipolar, worse since the delivery of her infant.  Suspect postpartum depression overlying her underlying mental illness.    She was apparently in crisis earlier today but never suicidal or homicidal toward her  nor her family members.  She is now calm, cooperative, alert.  The counselor feels that she is safe for outpatient management.  She Prabhjot has an appointment with [facility] tomorrow.  Recommendation is that she keep that.  At this point no indication that she poses an ongoing risk to herself or others that would require any choice hold.  Patient feels comfortable going home with her .  No indication for inpatient admission on a voluntary basis.    Precautions for return were reviewed.    Clinical impression  1.  Depression, likely postpartum depression     Baron Alberto MD  11/27/18 0012

## 2018-12-17 NOTE — PROGRESS NOTES
Clinic Care Coordination Contact  [facility]    Referral Source: ED Follow-Up  Clinical Data: Care Coordinator Outreach  Outreach attempted x 2.  Unable to leave a message. VM is full.    Plan: Care Coordinator will do no further outreaches at this time.    Krystian Roque [facility]  Clinic Care Coordinator  [facility]  [phone]  [email or web address]

## 2019-02-25 LAB
HBV SURFACE AG SERPL QL IA: NEGATIVE
RUBELLA ANTIBODY IGG QUANTITATIVE: NORMAL IU/ML
TREPONEMA ANTIBODIES: NON REACTIVE

## 2019-04-18 ENCOUNTER — TRANSFERRED RECORDS (OUTPATIENT)
Dept: HEALTH INFORMATION MANAGEMENT | Facility: CLINIC | Age: 34
End: 2019-04-18

## 2019-04-18 ENCOUNTER — MEDICAL CORRESPONDENCE (OUTPATIENT)
Dept: HEALTH INFORMATION MANAGEMENT | Facility: CLINIC | Age: 34
End: 2019-04-18

## 2019-05-30 ENCOUNTER — PRE VISIT (OUTPATIENT)
Dept: MATERNAL FETAL MEDICINE | Facility: CLINIC | Age: 34
End: 2019-05-30

## 2019-06-06 ENCOUNTER — OFFICE VISIT (OUTPATIENT)
Dept: MATERNAL FETAL MEDICINE | Facility: CLINIC | Age: 34
End: 2019-06-06
Attending: OBSTETRICS & GYNECOLOGY
Payer: COMMERCIAL

## 2019-06-06 ENCOUNTER — HOSPITAL ENCOUNTER (OUTPATIENT)
Dept: ULTRASOUND IMAGING | Facility: CLINIC | Age: 34
Discharge: HOME | End: 2019-06-06
Attending: OBSTETRICS & GYNECOLOGY | Admitting: OBSTETRICS & GYNECOLOGY
Payer: COMMERCIAL

## 2019-06-06 DIAGNOSIS — O26.90 PREGNANCY RELATED CONDITION, ANTEPARTUM: ICD-10-CM

## 2019-06-06 DIAGNOSIS — O09.819 PREGNANCY RESULTING FROM IN VITRO FERTILIZATION, ANTEPARTUM: ICD-10-CM

## 2019-06-06 DIAGNOSIS — O10.019 PRE-EXISTING ESSENTIAL HYPERTENSION DURING PREGNANCY, ANTEPARTUM: ICD-10-CM

## 2019-06-06 DIAGNOSIS — O09.529 AMA (ADVANCED MATERNAL AGE) MULTIGRAVIDA 35+, UNSPECIFIED TRIMESTER: Primary | ICD-10-CM

## 2019-06-06 DIAGNOSIS — O09.522 SUPERVISION OF ELDERLY MULTIGRAVIDA IN SECOND TRIMESTER: Primary | ICD-10-CM

## 2019-06-06 PROCEDURE — 40000072 ZZH STATISTIC GENETIC COUNSELING, < 16 MIN: Mod: ZF | Performed by: GENETIC COUNSELOR, MS

## 2019-06-06 PROCEDURE — 76811 OB US DETAILED SNGL FETUS: CPT

## 2019-06-06 NOTE — PROGRESS NOTES
Please see ultrasound report under imaging tab for details on ultrasound performed today.    Felisha Alva MD  , OB/GYN  Maternal-Fetal Medicine  [email or web address]  [phone] (Academic office)  [phone] (Pager)

## 2019-06-06 NOTE — PROGRESS NOTES
[facility] [place]  Genetic Counseling Consult    Patient:  Hilda Ghosh YOB: 1985   Date of Service:  19      Hilda Ghosh was seen with her , Irvin, at the [facility] [place] for genetic consultation as part of her appointment for comprehensive ultrasound.  The indication for genetic counseling is advanced maternal age.       Impression/Plan:   1. Hilda had a cell-free fetal DNA test earlier in pregnancy, which was normal.    2. Hilda had a comprehensive (level II) ultrasound today.  Please see the ultrasound report for further details.    3. The patient declines genetic amniocentesis and additional maternal serum screening today.    Pregnancy History:   /Parity:    Age at Delivery: 34 year old  NE: 10/31/2019, by Est. Date of Conception  Gestational Age: 19w0d    No significant complications or exposures were reported in the current pregnancy.    Hilda s pregnancy history is significant for four miscarriages. Hilda and Irvin reported that they had a long history of infertility and opted to use donor embryos. Hilda's first three embryo transfers were from the same donors. The first of those three embryo transfers resulted in a MAB which required a D+C. Karyotype analysis on products of conception indicated trisomy 22. The two subsequent embryo transfers were chemical pregnancies. Cyrus's fourth pregnancy was conceived by intrauterine insemination but resulted in SAB.    Hidla's fifth pregnancy was conceived via IVF using a donor embryo (different egg and sperm donor than the first three pregnancies). The egg donor was a 34-year-old Thai female and the sperm donor was a 53-year-old  male. The pregnancy went to term and Hilda's daughter, Chana, was born via  in the  of .     This is Hilda's sixth pregnancy. She and Irvin opted to use a donor embryo that is a  genetic full sibling to their daughter. This was a frozen embryo transfer with a transfer date of 2/12/19. One embryo was transferred and a mathias pregnancy was identified by early ultrasound.     Medical History:   Hilda s reported medical history is not expected to impact pregnancy management or risks to fetal development.       Family History:   A family history was not obtained as this pregnancy was conceived by a donor embryo. Hilda did not report any significant family history reported for the egg donor or sperm donor       Risk Assessment for Chromosome Conditions:   We explained that the risk for fetal chromosome abnormalities increases with maternal age. We discussed specific features of common chromosome abnormalities, including Down syndrome, trisomy 13, trisomy 18, and sex chromosome trisomies.      - At age 32 at midtrimester, the risk to have a baby with Down syndrome is 1 in 342.     - At age 32 at midtrimester, the risk to have a baby with any chromosome abnormality is 1 in  172.       Hilda had maternal serum screening earlier in pregnancy.    Non-invasive Prenatal Testing (NIPT)    Maternal plasma cell-free DNA testing    Screens for fetal trisomy 21, trisomy 13, trisomy 18, and sex chromosome aneuploidy    Hilda had an InformaSeq test earlier in pregnancy; we reviewed the results today, which are normal for chromosome 13, chromosome 18 and chromosome 21 (no aneuploidy detected)    Given the accuracy of this test, these results greatly decrease the chance for certain fetal chromosome abnormalities    We discussed the limitations of normal NIPT results    MSAFP (after 15 weeks for open neural tube defect screening) results were within normal limits, which decreased the risk of an open neural tube defect in the pregnancy to 1 in 10,000.      Testing Options:   We discussed the following options:   Non-invasive Prenatal Testing (NIPT)    Maternal plasma cell-free DNA testing; first trimester  ultrasound with nuchal translucency and nasal bone assessment is recommended, when appropriate    Screens for fetal trisomy 21, trisomy 13, trisomy 18, and sex chromosome aneuploidy    Cannot screen for open neural tube defects; maternal serum AFP after 15 weeks is recommended       Genetic Amniocentesis    Invasive procedure typically performed in the second trimester by which amniotic fluid is obtained for the purpose of chromosome analysis and/or other prenatal genetic analysis    Diagnostic results; >99% sensitivity for fetal chromosome abnormalities    AFAFP measurement tests for open neural tube defects       Comprehensive (Level II) ultrasound: Detailed ultrasound performed between 18-22 weeks gestation to screen for major birth defects and markers for aneuploidy.        We reviewed the benefits and limitations of this testing.  Screening tests provide a risk assessment specific to the pregnancy for certain fetal chromosome abnormalities, but cannot definitively diagnose or exclude a fetal chromosome abnormality.  Follow-up genetic counseling and consideration of diagnostic testing is recommended with any abnormal screening result.     Diagnostic tests carry inherent risks- including risk of miscarriage- that require careful consideration.  These tests can detect fetal chromosome abnormalities with greater than 99% certainty.  Results can be compromised by maternal cell contamination or mosaicism, and are limited by the resolution of cytogenetic G-banding technology.  There is no screening nor diagnostic test that can detect all forms of birth defects or mental disability.    It was a pleasure to be involved with Hilda warner care. Face-to-face time of the meeting was 15 minutes.      Roberta Shah MS, [facility]  Maternal Fetal Medicine  [facility]  Ph: [phone]  [email or web address]

## 2019-07-01 ENCOUNTER — HOSPITAL ENCOUNTER (OUTPATIENT)
Dept: ULTRASOUND IMAGING | Facility: CLINIC | Age: 34
Discharge: HOME | End: 2019-07-01
Attending: OBSTETRICS & GYNECOLOGY | Admitting: OBSTETRICS & GYNECOLOGY
Payer: COMMERCIAL

## 2019-07-01 ENCOUNTER — HOSPITAL ENCOUNTER (OUTPATIENT)
Dept: ULTRASOUND IMAGING | Facility: CLINIC | Age: 34
End: 2019-07-01
Attending: OBSTETRICS & GYNECOLOGY
Payer: COMMERCIAL

## 2019-07-01 ENCOUNTER — OFFICE VISIT (OUTPATIENT)
Dept: MATERNAL FETAL MEDICINE | Facility: CLINIC | Age: 34
End: 2019-07-01
Attending: OBSTETRICS & GYNECOLOGY
Payer: COMMERCIAL

## 2019-07-01 DIAGNOSIS — O26.90 PREGNANCY RELATED CONDITION, ANTEPARTUM: ICD-10-CM

## 2019-07-01 DIAGNOSIS — O10.019 PRE-EXISTING ESSENTIAL HYPERTENSION DURING PREGNANCY, ANTEPARTUM: ICD-10-CM

## 2019-07-01 DIAGNOSIS — O09.529 AMA (ADVANCED MATERNAL AGE) MULTIGRAVIDA 35+, UNSPECIFIED TRIMESTER: Primary | ICD-10-CM

## 2019-07-01 DIAGNOSIS — O09.819 PREGNANCY RESULTING FROM IN VITRO FERTILIZATION, ANTEPARTUM: ICD-10-CM

## 2019-07-01 PROCEDURE — 76816 OB US FOLLOW-UP PER FETUS: CPT

## 2019-07-01 PROCEDURE — 76825 ECHO EXAM OF FETAL HEART: CPT

## 2019-07-01 NOTE — PROGRESS NOTES
Please see ultrasound report under imaging tab for details on ultrasound performed today.    Felisha Alva MD  , OB/GYN  Maternal-Fetal Medicine  [email or web address]  [phone] (Academic office)  [phone] (Pager)

## 2019-07-23 ENCOUNTER — HOSPITAL ENCOUNTER (EMERGENCY)
Facility: CLINIC | Age: 34
Discharge: HOME | End: 2019-07-23
Attending: PHYSICIAN ASSISTANT | Admitting: PHYSICIAN ASSISTANT
Payer: COMMERCIAL

## 2019-07-23 VITALS
SYSTOLIC BLOOD PRESSURE: 146 MMHG | BODY MASS INDEX: 31.9 KG/M2 | DIASTOLIC BLOOD PRESSURE: 84 MMHG | HEART RATE: 84 BPM | RESPIRATION RATE: 16 BRPM | OXYGEN SATURATION: 94 % | WEIGHT: 216 LBS | TEMPERATURE: 98.3 F

## 2019-07-23 DIAGNOSIS — J32.9 SINUSITIS: ICD-10-CM

## 2019-07-23 DIAGNOSIS — R05.9 COUGH: ICD-10-CM

## 2019-07-23 PROCEDURE — 99283 EMERGENCY DEPT VISIT LOW MDM: CPT

## 2019-07-23 RX ORDER — FLUTICASONE PROPIONATE 50 MCG
1 SPRAY, SUSPENSION (ML) NASAL DAILY
Qty: 11.1 ML | Refills: 0 | Status: ON HOLD | OUTPATIENT
Start: 2019-07-23 | End: 2019-09-30

## 2019-07-23 RX ORDER — ALBUTEROL SULFATE 0.83 MG/ML
2.5 SOLUTION RESPIRATORY (INHALATION) ONCE
Status: DISCONTINUED | OUTPATIENT
Start: 2019-07-23 | End: 2019-07-23 | Stop reason: HOSPADM

## 2019-07-23 ASSESSMENT — ENCOUNTER SYMPTOMS
LIGHT-HEADEDNESS: 1
FEVER: 0
SORE THROAT: 1
SINUS PRESSURE: 1
SHORTNESS OF BREATH: 1
VOMITING: 0
HEADACHES: 1
COUGH: 1
NAUSEA: 1

## 2019-07-23 NOTE — ED AVS SNAPSHOT
[facility] Emergency Department  [address]  [facility] [address]  Phone:  [phone]  Fax:  [phone]                                    Hilda Ghosh   MRN: [id number]    Department:  [facility] Emergency Department   Date of Visit:  7/23/2019           After Visit Summary Signature Page    I have received my discharge instructions, and my questions have been answered. I have discussed any challenges I see with this plan with the nurse or doctor.    ..........................................................................................................................................  Patient/Patient Representative Signature      ..........................................................................................................................................  Patient Representative Print Name and Relationship to Patient    ..................................................               ................................................  Date                                   Time    ..........................................................................................................................................  Reviewed by Signature/Title    ...................................................              ..............................................  Date                                               Time          22EPIC Rev 08/18

## 2019-07-24 NOTE — DISCHARGE INSTRUCTIONS

## 2019-07-24 NOTE — ED PROVIDER NOTES
History     Chief Complaint:  Cough and congestion     HPI   Hilda Ghosh is a 26 week pregnant 34 year old female with a history of HTN who presents to the emergency department for evaluation of cough and congestion. The patient reports she has had a productive cough along with shortness of breath, congestion, sinus pressure, headache, ear ache, sore throat, nausea, and light-headedness for the past day and a half. She states she called a nurse line inquiring about what medications would be safe to take for her symptoms with pregnancy, and the nurse instructed her to present to the ED. The patient denies vomiting and fever. She reports she took Sudafed twice prior to arrival with no symptom improvement. She indicates she has no concerns for the baby as she can feel him moving and denies abdominal pain or vaginal bleeding. She denies any associated fever, ill contacts or recent exotic travel. The patient reports Unisom and Vitamin B6  And plans on trying this for her nausea. She denies past history of asthma and smoking.    Allergies:  NKDA    Medications:    Adderall   Lamictal   Celexa  Strattera   Trandate  Hydrochlorothiazide   Buspar   Propranolol      Past Medical History:    Anxiety  Depression   Fibroid  ADHD  HTN  Adjustment disorder with mixed anxiety and depressed mood   ROXANNE III    Past Surgical History:    C section  D & C   Left ring finger attachment     Family History:    Anxiety   Depression  Schizophrenia   Alcohol/ drug abuse    Social History:  Presents with  and daughter.   Former smoker.  Negative for alcohol use.   Marital Status:   [2]     Review of Systems   Constitutional: Negative for fever.   HENT: Positive for congestion, ear pain, sinus pressure and sore throat.    Respiratory: Positive for cough and shortness of breath.    Gastrointestinal: Positive for nausea. Negative for vomiting.   Neurological: Positive for light-headedness and headaches.   All other systems  reviewed and are negative.    Physical Exam     Patient Vitals for the past 24 hrs:   BP Temp Temp src Pulse Heart Rate Resp SpO2 Weight   07/23/19 1950 146/84 98.3  F (36.8  C) Oral 84 84 16 94 % 98 kg (216 lb)      Physical Exam  General: Resting comfortably.  Alert and oriented.   Head:  The scalp, face, and head appear normal   Eyes:  Conjunctivae and sclerae are normal    ENT:    The oropharynx is normal    Uvula is in the midline     Moist mucous membranes    Bilateral TMs are normal without evidence of infection.   Neck:  No lymphadenopathy  CV:  Regular rate and rhythm     Normal S1/S2  Resp:  Lungs are clear to auscultation    Non-labored    No rales or wheezing   MS:  Normal muscular tone   Skin:  No rash or acute skin lesions noted   Neuro: Speech is normal and fluent.       Emergency Department Course      Emergency Department Course:  Nursing notes and vitals reviewed. 2045 I performed an exam of the patient as documented above.     Findings and plan explained to the Patient and spouse. Patient discharged home with instructions regarding supportive care, medications, and reasons to return. The importance of close follow-up was reviewed. The patient was prescribed Flonase.     Impression & Plan      Medical Decision Making:  Hilda Ghosh is a 34 year old female who presents for evaluation of cough and congestion. She is vitally stable and afeberile. They are well appearing and non septic. This is consistent with an upper respiratory tract infection. There is no signs at this point of bacterial infection such as OM, retropharyngeal abscess, epiglottitis, peritonsillar abcess, strep pharyngitis, pneumonia, sinusitis, meningitis, bacteremia. The patient is well hydrated and otherwise well-appearing. Given clear lungs, no fever, no hypoxia and no respiratory distress I do not feel patient needs a CXR at this point as the probability of bacterial pneumonia is very unlikely. The patient is discharged with  prescriptions for symptomatic treatment with Flonase. I recommended use of Ibuprofen and Tylenol, increased rest/hydration and gave her a list of medications that were safe in pregnancy. She was asked to follow up with their PCP in 2 days for recheck. She was asked to return immediately for fevers, respiratory distress, trouble breathing, protracted vomiting, confusion or any other concerns. All questions were answered prior to discharge. The patient understands and agrees to this plan.     Diagnosis:    ICD-10-CM   1. Cough R05   2. Sinusitis J32.9     Disposition:  discharged to home    Discharge Medications:     Medication List      Started    fluticasone 50 MCG/ACT nasal spray  Commonly known as:  FLONASE  1 spray, Both Nostrils, DAILY          ISydnee, am serving as a scribe on 7/23/2019 at 8:55 PM to personally document services performed by Vanessa Rico PA* based on my observations and the provider's statements to me.      Sydnee Sadler  7/23/2019   [facility] EMERGENCY DEPARTMENT       Vanessa Rico PA-C  07/24/19 0037

## 2019-09-30 ENCOUNTER — HOSPITAL ENCOUNTER (OUTPATIENT)
Facility: CLINIC | Age: 34
LOS: 1 days | Discharge: HOME | End: 2019-09-30
Attending: OBSTETRICS & GYNECOLOGY | Admitting: OBSTETRICS & GYNECOLOGY
Payer: COMMERCIAL

## 2019-09-30 VITALS
HEART RATE: 70 BPM | BODY MASS INDEX: 32.73 KG/M2 | TEMPERATURE: 98.8 F | WEIGHT: 221 LBS | DIASTOLIC BLOOD PRESSURE: 79 MMHG | RESPIRATION RATE: 14 BRPM | HEIGHT: 69 IN | SYSTOLIC BLOOD PRESSURE: 142 MMHG

## 2019-09-30 PROBLEM — I10 HYPERTENSION: Status: ACTIVE | Noted: 2019-09-30

## 2019-09-30 LAB
ALT SERPL W P-5'-P-CCNC: 21 U/L (ref 0–50)
AST SERPL W P-5'-P-CCNC: 15 U/L (ref 0–45)
CREAT SERPL-MCNC: 0.57 MG/DL (ref 0.52–1.04)
CREAT UR-MCNC: 139 MG/DL
ERYTHROCYTE [DISTWIDTH] IN BLOOD BY AUTOMATED COUNT: 13 % (ref 10–15)
GFR SERPL CREATININE-BSD FRML MDRD: >90 ML/MIN/{1.73_M2}
HCT VFR BLD AUTO: 38.1 % (ref 35–47)
HGB BLD-MCNC: 12.6 G/DL (ref 11.7–15.7)
MCH RBC QN AUTO: 30.4 PG (ref 26.5–33)
MCHC RBC AUTO-ENTMCNC: 33.1 G/DL (ref 31.5–36.5)
MCV RBC AUTO: 92 FL (ref 78–100)
PLATELET # BLD AUTO: 250 10E9/L (ref 150–450)
PROT UR-MCNC: 0.17 G/L
PROT/CREAT 24H UR: 0.12 G/G CR (ref 0–0.2)
RBC # BLD AUTO: 4.15 10E12/L (ref 3.8–5.2)
URATE SERPL-MCNC: 4.1 MG/DL (ref 2.6–6)
WBC # BLD AUTO: 8.8 10E9/L (ref 4–11)

## 2019-09-30 PROCEDURE — 84450 TRANSFERASE (AST) (SGOT): CPT | Performed by: OBSTETRICS & GYNECOLOGY

## 2019-09-30 PROCEDURE — 84460 ALANINE AMINO (ALT) (SGPT): CPT | Performed by: OBSTETRICS & GYNECOLOGY

## 2019-09-30 PROCEDURE — 82565 ASSAY OF CREATININE: CPT | Performed by: OBSTETRICS & GYNECOLOGY

## 2019-09-30 PROCEDURE — 84156 ASSAY OF PROTEIN URINE: CPT | Performed by: OBSTETRICS & GYNECOLOGY

## 2019-09-30 PROCEDURE — 59025 FETAL NON-STRESS TEST: CPT

## 2019-09-30 PROCEDURE — 84550 ASSAY OF BLOOD/URIC ACID: CPT | Performed by: OBSTETRICS & GYNECOLOGY

## 2019-09-30 PROCEDURE — 85027 COMPLETE CBC AUTOMATED: CPT | Performed by: OBSTETRICS & GYNECOLOGY

## 2019-09-30 PROCEDURE — 36415 COLL VENOUS BLD VENIPUNCTURE: CPT | Performed by: OBSTETRICS & GYNECOLOGY

## 2019-09-30 PROCEDURE — G0463 HOSPITAL OUTPT CLINIC VISIT: HCPCS | Mod: 25

## 2019-09-30 RX ORDER — CITALOPRAM HYDROBROMIDE 40 MG/1
20 TABLET ORAL DAILY
COMMUNITY
End: 2024-05-27

## 2019-09-30 RX ORDER — ONDANSETRON 2 MG/ML
4 INJECTION INTRAMUSCULAR; INTRAVENOUS EVERY 6 HOURS PRN
Status: DISCONTINUED | OUTPATIENT
Start: 2019-09-30 | End: 2019-10-01 | Stop reason: HOSPADM

## 2019-09-30 RX ORDER — ASPIRIN 81 MG/1
81 TABLET ORAL DAILY
Status: ON HOLD | COMMUNITY
End: 2019-10-12

## 2019-09-30 RX ORDER — LABETALOL 200 MG/1
200 TABLET, FILM COATED ORAL 2 TIMES DAILY
COMMUNITY
End: 2024-05-27

## 2019-09-30 ASSESSMENT — ACTIVITIES OF DAILY LIVING (ADL)
FALL_HISTORY_WITHIN_LAST_SIX_MONTHS: NO
SWALLOWING: 0-->SWALLOWS FOODS/LIQUIDS WITHOUT DIFFICULTY
COGNITION: 0 - NO COGNITION ISSUES REPORTED
TRANSFERRING: 0-->INDEPENDENT
AMBULATION: 0-->INDEPENDENT
RETIRED_COMMUNICATION: 0-->UNDERSTANDS/COMMUNICATES WITHOUT DIFFICULTY
RETIRED_EATING: 0-->INDEPENDENT
DRESS: 0-->INDEPENDENT
BATHING: 0-->INDEPENDENT
TOILETING: 0-->INDEPENDENT

## 2019-09-30 ASSESSMENT — MIFFLIN-ST. JEOR: SCORE: 1756.83

## 2019-10-01 NOTE — PLAN OF CARE
: Pt is a 35+4 , who has a planned Repeat C/S for 38+5 who arrived to [facility] c/o HTN of 170's/100's today while at home. Pt states she has chronic HTN & was on anti-hypertensives prior to pregnancy. Pts PO Labetalol was increased from 100 mg BID to 200 mg BID on 19 for HTN. Pt reports that she has continued to have HTN of 160's/90's throughout this last weekend, & also reports intermittent headaches, although denies any headache at this time. Pt denies any vaginal discharge or bleeding, contractions, abdominal pain, vision changes, edema, or any other concerns.  : Dr. ZAYDA Julien paged for & notified of pts arrival to [facility].  : Spoke to Dr. ZAYDA Julien via phone & received T.O.R.B. for PIH orders.   : Report off to Clemencia SUNG RN at this time.

## 2019-10-01 NOTE — DISCHARGE INSTRUCTIONS
Discharge Instruction for Undelivered Patients      You were seen for: increased blood pressures  We Consulted: Dr Julien  You had (Test or Medicine):Non stress and labs     Diet:   Drink 8 to 12 glasses of liquids (milk, juice, water) every day.     Activity:  Call your doctor or nurse midwife if your baby is moving less than usual.     Call your provider if you notice:  Swelling in your face or increased swelling in your hands or legs.  Headaches that are not relieved by Tylenol (acetaminophen).  Changes in your vision (blurring: seeing spots or stars.)  Nausea (sick to your stomach) and vomiting (throwing up).   Weight gain of 5 pounds or more per week.  Heartburn that doesn't go away.  Signs of bladder infection: pain when you urinate (use the toilet), need to go more often and more urgently.  The bag of lynch (rupture of membranes) breaks, or you notice leaking in your underwear.  Bright red blood in your underwear.  Abdominal (lower belly) or stomach pain.  For first baby: Contractions (tightening) less than 5 minutes apart for one hour or more.  Second (plus) baby: Contractions (tightening) less than 10 minutes apart and getting stronger.  *If less than 34 weeks: Contractions (tightenings) more than 6 times in one hour.  Increase or change in vaginal discharge (note the color and amount)  Other: Call for any questions or concerns. Call for headache that will not go away with Tylenol. Right sided upper quadrant pain or changes in vision.    Follow-up:  Make an appointment to be seen on 10/1 with Dr Rubin.

## 2019-10-01 NOTE — PLAN OF CARE
PT arrived from home with c/o increased bp reading at home. PT has CHTN. Dr Julien notified of patients arrival. Orders received. PT placed on EFM and serial BP's taken. Dr Julien notified of lab results and serial BP's. Orders received to discontinue to home and follow up with Dr Rubin in am.

## 2019-10-02 ENCOUNTER — HOSPITAL ENCOUNTER (OUTPATIENT)
Facility: CLINIC | Age: 34
LOS: 1 days | Discharge: HOME | End: 2019-10-02
Attending: OBSTETRICS & GYNECOLOGY | Admitting: OBSTETRICS & GYNECOLOGY
Payer: COMMERCIAL

## 2019-10-02 VITALS
SYSTOLIC BLOOD PRESSURE: 134 MMHG | DIASTOLIC BLOOD PRESSURE: 79 MMHG | RESPIRATION RATE: 16 BRPM | HEART RATE: 73 BPM | TEMPERATURE: 98.4 F

## 2019-10-02 LAB
ALT SERPL W P-5'-P-CCNC: 20 U/L (ref 0–50)
AST SERPL W P-5'-P-CCNC: 14 U/L (ref 0–45)
CREAT SERPL-MCNC: 0.62 MG/DL (ref 0.52–1.04)
ERYTHROCYTE [DISTWIDTH] IN BLOOD BY AUTOMATED COUNT: 13 % (ref 10–15)
GFR SERPL CREATININE-BSD FRML MDRD: >90 ML/MIN/{1.73_M2}
HCT VFR BLD AUTO: 38 % (ref 35–47)
HGB BLD-MCNC: 12.7 G/DL (ref 11.7–15.7)
MCH RBC QN AUTO: 30.6 PG (ref 26.5–33)
MCHC RBC AUTO-ENTMCNC: 33.4 G/DL (ref 31.5–36.5)
MCV RBC AUTO: 92 FL (ref 78–100)
PLATELET # BLD AUTO: 237 10E9/L (ref 150–450)
RBC # BLD AUTO: 4.15 10E12/L (ref 3.8–5.2)
WBC # BLD AUTO: 8 10E9/L (ref 4–11)

## 2019-10-02 PROCEDURE — 36415 COLL VENOUS BLD VENIPUNCTURE: CPT | Performed by: OBSTETRICS & GYNECOLOGY

## 2019-10-02 PROCEDURE — G0463 HOSPITAL OUTPT CLINIC VISIT: HCPCS | Mod: 25

## 2019-10-02 PROCEDURE — 59025 FETAL NON-STRESS TEST: CPT

## 2019-10-02 PROCEDURE — 82565 ASSAY OF CREATININE: CPT | Performed by: OBSTETRICS & GYNECOLOGY

## 2019-10-02 PROCEDURE — 59025 FETAL NON-STRESS TEST: CPT | Mod: 26 | Performed by: OBSTETRICS & GYNECOLOGY

## 2019-10-02 PROCEDURE — 84450 TRANSFERASE (AST) (SGOT): CPT | Performed by: OBSTETRICS & GYNECOLOGY

## 2019-10-02 PROCEDURE — 85027 COMPLETE CBC AUTOMATED: CPT | Performed by: OBSTETRICS & GYNECOLOGY

## 2019-10-02 PROCEDURE — 84460 ALANINE AMINO (ALT) (SGPT): CPT | Performed by: OBSTETRICS & GYNECOLOGY

## 2019-10-02 RX ORDER — ACETAMINOPHEN 325 MG/1
975 TABLET ORAL ONCE
Status: DISCONTINUED | OUTPATIENT
Start: 2019-10-02 | End: 2019-10-02 | Stop reason: HOSPADM

## 2019-10-02 RX ORDER — ONDANSETRON 2 MG/ML
4 INJECTION INTRAMUSCULAR; INTRAVENOUS EVERY 6 HOURS PRN
Status: DISCONTINUED | OUTPATIENT
Start: 2019-10-02 | End: 2019-10-02 | Stop reason: HOSPADM

## 2019-10-02 NOTE — PLAN OF CARE
"1011 -  at 35+6/7 weeks presents to [facility] from home for evaluation of elevated BP's.  Per patient she has had increasing headaches for the past few days; denies taking Tylenol or other meds for pain relief. Patient has been checking BP's at home and this morning was 170/113.  Patient denies increased swelling, epigastric pain, or visual disturbances.  Patient is visibly and verbally upset that she is being evaluated at the hospital again for elevated BP's because \"every time I come here, my blood pressures are back to normal.\"  Emotional support given.  Patient is currently in the process of collecting a 24 hour urine. Dr. Ellingson called for orders.  Orders received.   POC discussed including monitoring, lab draw, serial BP's.  Patient verbally consents.  Monitors applied.  Admission assessment completed.  Reflexes normal. No clonus noted.     1030 - Lab at bedside for blood draw.    1116 - Lab results received and all are WNL.  Patient aware of results and requesting to speak with Dr. Knowles.  Dr. Knowles paged and updated via phone re: patient's arrival, current complaints, medical and pregnancy history, admission assessment, FHT's, contractions, BP's, lab results.  Call transferred to patient and MD had a conversation with patient re: POC, 24 hour urine in progress.  Orders received to discharge patient to home and f/u in clinic tomorrow as scheduled with Dr. Rubin.  Monitors removed.  Discharge instructions given re: pre-eclampsia precautions, PTL precautions.  Patient verbalizes understanding and denies further questions at this time.  Patient discharged to home undelivered.      "

## 2019-10-02 NOTE — DISCHARGE INSTRUCTIONS
Discharge Instruction for Undelivered Patients      You were seen for: Evaluation of elevated BP  We Consulted: Dr. Knowles  You had (Test or Medicine):Non-Stress Test, lab tests, serial BP's     Diet:   Drink 8 to 12 glasses of liquids (milk, juice, water) every day.  You may eat meals and snacks.     Activity:  Call your doctor or nurse midwife if your baby is moving less than usual.     Call your provider if you notice:  Swelling in your face or increased swelling in your hands or legs.  Headaches that are not relieved by Tylenol (acetaminophen).  Changes in your vision (blurring: seeing spots or stars.)  Nausea (sick to your stomach) and vomiting (throwing up).   Weight gain of 5 pounds or more per week.  Heartburn that doesn't go away.  Signs of bladder infection: pain when you urinate (use the toilet), need to go more often and more urgently.  The bag of lynch (rupture of membranes) breaks, or you notice leaking in your underwear.  Bright red blood in your underwear.  Abdominal (lower belly) or stomach pain.  Second (plus) baby: Contractions (tightening) less than 10 minutes apart and getting stronger.  Increase or change in vaginal discharge (note the color and amount)    Follow-up:  As scheduled in the clinic

## 2019-10-03 LAB — GROUP B STREP PCR: NEGATIVE

## 2019-10-10 ENCOUNTER — HOSPITAL ENCOUNTER (INPATIENT)
Facility: CLINIC | Age: 34
LOS: 3 days | Discharge: HOME | End: 2019-10-13
Attending: OBSTETRICS & GYNECOLOGY | Admitting: OBSTETRICS & GYNECOLOGY
Payer: COMMERCIAL

## 2019-10-10 ENCOUNTER — ANESTHESIA EVENT (OUTPATIENT)
Dept: OBGYN | Facility: CLINIC | Age: 34
End: 2019-10-10
Payer: COMMERCIAL

## 2019-10-10 ENCOUNTER — ANESTHESIA (OUTPATIENT)
Dept: OBGYN | Facility: CLINIC | Age: 34
End: 2019-10-10
Payer: COMMERCIAL

## 2019-10-10 LAB
ABO + RH BLD: NORMAL
ABO + RH BLD: NORMAL
ALT SERPL W P-5'-P-CCNC: 22 U/L (ref 0–50)
AST SERPL W P-5'-P-CCNC: 14 U/L (ref 0–45)
BASOPHILS # BLD AUTO: 0 10E9/L (ref 0–0.2)
BASOPHILS NFR BLD AUTO: 0.1 %
BLD GP AB SCN SERPL QL: NORMAL
BLOOD BANK CMNT PATIENT-IMP: NORMAL
CREAT SERPL-MCNC: 0.55 MG/DL (ref 0.52–1.04)
DIFFERENTIAL METHOD BLD: NORMAL
EOSINOPHIL # BLD AUTO: 0.1 10E9/L (ref 0–0.7)
EOSINOPHIL NFR BLD AUTO: 0.8 %
ERYTHROCYTE [DISTWIDTH] IN BLOOD BY AUTOMATED COUNT: 12.9 % (ref 10–15)
GFR SERPL CREATININE-BSD FRML MDRD: >90 ML/MIN/{1.73_M2}
HCT VFR BLD AUTO: 39.9 % (ref 35–47)
HGB BLD-MCNC: 13.2 G/DL (ref 11.7–15.7)
IMM GRANULOCYTES # BLD: 0 10E9/L (ref 0–0.4)
IMM GRANULOCYTES NFR BLD: 0.1 %
LYMPHOCYTES # BLD AUTO: 1.9 10E9/L (ref 0.8–5.3)
LYMPHOCYTES NFR BLD AUTO: 23 %
MCH RBC QN AUTO: 30.3 PG (ref 26.5–33)
MCHC RBC AUTO-ENTMCNC: 33.1 G/DL (ref 31.5–36.5)
MCV RBC AUTO: 92 FL (ref 78–100)
MONOCYTES # BLD AUTO: 0.9 10E9/L (ref 0–1.3)
MONOCYTES NFR BLD AUTO: 10.4 %
NEUTROPHILS # BLD AUTO: 5.5 10E9/L (ref 1.6–8.3)
NEUTROPHILS NFR BLD AUTO: 65.6 %
NRBC # BLD AUTO: 0 10*3/UL
NRBC BLD AUTO-RTO: 0 /100
PLATELET # BLD AUTO: 296 10E9/L (ref 150–450)
RBC # BLD AUTO: 4.36 10E12/L (ref 3.8–5.2)
SPECIMEN EXP DATE BLD: NORMAL
WBC # BLD AUTO: 8.4 10E9/L (ref 4–11)

## 2019-10-10 PROCEDURE — 85025 COMPLETE CBC W/AUTO DIFF WBC: CPT | Performed by: OBSTETRICS & GYNECOLOGY

## 2019-10-10 PROCEDURE — 84460 ALANINE AMINO (ALT) (SGPT): CPT | Performed by: OBSTETRICS & GYNECOLOGY

## 2019-10-10 PROCEDURE — 71000012 ZZH RECOVERY PHASE 1 LEVEL 1 FIRST HR: Performed by: OBSTETRICS & GYNECOLOGY

## 2019-10-10 PROCEDURE — 25000132 ZZH RX MED GY IP 250 OP 250 PS 637: Performed by: OBSTETRICS & GYNECOLOGY

## 2019-10-10 PROCEDURE — 37000008 ZZH ANESTHESIA TECHNICAL FEE, 1ST 30 MIN: Performed by: OBSTETRICS & GYNECOLOGY

## 2019-10-10 PROCEDURE — 12000035 ZZH R&B POSTPARTUM

## 2019-10-10 PROCEDURE — 25000128 H RX IP 250 OP 636: Performed by: NURSE ANESTHETIST, CERTIFIED REGISTERED

## 2019-10-10 PROCEDURE — 25800030 ZZH RX IP 258 OP 636: Performed by: OBSTETRICS & GYNECOLOGY

## 2019-10-10 PROCEDURE — 86900 BLOOD TYPING SEROLOGIC ABO: CPT | Performed by: OBSTETRICS & GYNECOLOGY

## 2019-10-10 PROCEDURE — 25000128 H RX IP 250 OP 636: Performed by: OBSTETRICS & GYNECOLOGY

## 2019-10-10 PROCEDURE — 25000128 H RX IP 250 OP 636: Performed by: ANESTHESIOLOGY

## 2019-10-10 PROCEDURE — 86780 TREPONEMA PALLIDUM: CPT | Performed by: OBSTETRICS & GYNECOLOGY

## 2019-10-10 PROCEDURE — 86850 RBC ANTIBODY SCREEN: CPT | Performed by: OBSTETRICS & GYNECOLOGY

## 2019-10-10 PROCEDURE — 36000056 ZZH SURGERY LEVEL 3 1ST 30 MIN: Performed by: OBSTETRICS & GYNECOLOGY

## 2019-10-10 PROCEDURE — 84450 TRANSFERASE (AST) (SGOT): CPT | Performed by: OBSTETRICS & GYNECOLOGY

## 2019-10-10 PROCEDURE — 36000058 ZZH SURGERY LEVEL 3 EA 15 ADDTL MIN: Performed by: OBSTETRICS & GYNECOLOGY

## 2019-10-10 PROCEDURE — 25000132 ZZH RX MED GY IP 250 OP 250 PS 637

## 2019-10-10 PROCEDURE — 36415 COLL VENOUS BLD VENIPUNCTURE: CPT | Performed by: OBSTETRICS & GYNECOLOGY

## 2019-10-10 PROCEDURE — 25000125 ZZHC RX 250: Performed by: OBSTETRICS & GYNECOLOGY

## 2019-10-10 PROCEDURE — 86901 BLOOD TYPING SEROLOGIC RH(D): CPT | Performed by: OBSTETRICS & GYNECOLOGY

## 2019-10-10 PROCEDURE — 25000125 ZZHC RX 250: Performed by: NURSE ANESTHETIST, CERTIFIED REGISTERED

## 2019-10-10 PROCEDURE — 37000009 ZZH ANESTHESIA TECHNICAL FEE, EACH ADDTL 15 MIN: Performed by: OBSTETRICS & GYNECOLOGY

## 2019-10-10 PROCEDURE — 25800030 ZZH RX IP 258 OP 636: Performed by: NURSE ANESTHETIST, CERTIFIED REGISTERED

## 2019-10-10 PROCEDURE — 27210794 ZZH OR GENERAL SUPPLY STERILE: Performed by: OBSTETRICS & GYNECOLOGY

## 2019-10-10 PROCEDURE — 82565 ASSAY OF CREATININE: CPT | Performed by: OBSTETRICS & GYNECOLOGY

## 2019-10-10 RX ORDER — ONDANSETRON 2 MG/ML
INJECTION INTRAMUSCULAR; INTRAVENOUS
Status: DISCONTINUED
Start: 2019-10-10 | End: 2019-10-10 | Stop reason: HOSPADM

## 2019-10-10 RX ORDER — BUPIVACAINE HYDROCHLORIDE 7.5 MG/ML
INJECTION, SOLUTION INTRASPINAL PRN
Status: DISCONTINUED | OUTPATIENT
Start: 2019-10-10 | End: 2019-10-10

## 2019-10-10 RX ORDER — SIMETHICONE 80 MG
80 TABLET,CHEWABLE ORAL 4 TIMES DAILY PRN
Status: DISCONTINUED | OUTPATIENT
Start: 2019-10-10 | End: 2019-10-13 | Stop reason: HOSPADM

## 2019-10-10 RX ORDER — MORPHINE SULFATE 1 MG/ML
INJECTION, SOLUTION EPIDURAL; INTRATHECAL; INTRAVENOUS
Status: DISCONTINUED
Start: 2019-10-10 | End: 2019-10-10 | Stop reason: HOSPADM

## 2019-10-10 RX ORDER — NALOXONE HYDROCHLORIDE 0.4 MG/ML
.1-.4 INJECTION, SOLUTION INTRAMUSCULAR; INTRAVENOUS; SUBCUTANEOUS
Status: DISCONTINUED | OUTPATIENT
Start: 2019-10-10 | End: 2019-10-12

## 2019-10-10 RX ORDER — LAMOTRIGINE 100 MG/1
100 TABLET ORAL DAILY
Status: DISCONTINUED | OUTPATIENT
Start: 2019-10-10 | End: 2019-10-13 | Stop reason: HOSPADM

## 2019-10-10 RX ORDER — AMOXICILLIN 250 MG
2 CAPSULE ORAL 2 TIMES DAILY PRN
Status: DISCONTINUED | OUTPATIENT
Start: 2019-10-10 | End: 2019-10-13 | Stop reason: HOSPADM

## 2019-10-10 RX ORDER — CEFAZOLIN SODIUM 2 G/100ML
INJECTION, SOLUTION INTRAVENOUS
Status: DISCONTINUED
Start: 2019-10-10 | End: 2019-10-10 | Stop reason: HOSPADM

## 2019-10-10 RX ORDER — MORPHINE SULFATE 1 MG/ML
INJECTION, SOLUTION EPIDURAL; INTRATHECAL; INTRAVENOUS PRN
Status: DISCONTINUED | OUTPATIENT
Start: 2019-10-10 | End: 2019-10-10

## 2019-10-10 RX ORDER — LIDOCAINE 40 MG/G
CREAM TOPICAL
Status: DISCONTINUED | OUTPATIENT
Start: 2019-10-10 | End: 2019-10-11

## 2019-10-10 RX ORDER — PROCHLORPERAZINE 25 MG
25 SUPPOSITORY, RECTAL RECTAL EVERY 12 HOURS PRN
Status: DISCONTINUED | OUTPATIENT
Start: 2019-10-10 | End: 2019-10-13 | Stop reason: HOSPADM

## 2019-10-10 RX ORDER — BISACODYL 10 MG
10 SUPPOSITORY, RECTAL RECTAL DAILY PRN
Status: DISCONTINUED | OUTPATIENT
Start: 2019-10-12 | End: 2019-10-13 | Stop reason: HOSPADM

## 2019-10-10 RX ORDER — CITRIC ACID/SODIUM CITRATE 334-500MG
SOLUTION, ORAL ORAL
Status: DISCONTINUED
Start: 2019-10-10 | End: 2019-10-10 | Stop reason: HOSPADM

## 2019-10-10 RX ORDER — OXYTOCIN/0.9 % SODIUM CHLORIDE 30/500 ML
100 PLASTIC BAG, INJECTION (ML) INTRAVENOUS CONTINUOUS
Status: DISCONTINUED | OUTPATIENT
Start: 2019-10-10 | End: 2019-10-13 | Stop reason: HOSPADM

## 2019-10-10 RX ORDER — ONDANSETRON 2 MG/ML
INJECTION INTRAMUSCULAR; INTRAVENOUS PRN
Status: DISCONTINUED | OUTPATIENT
Start: 2019-10-10 | End: 2019-10-10

## 2019-10-10 RX ORDER — HYDROMORPHONE HYDROCHLORIDE 1 MG/ML
.3-.5 INJECTION, SOLUTION INTRAMUSCULAR; INTRAVENOUS; SUBCUTANEOUS EVERY 30 MIN PRN
Status: DISCONTINUED | OUTPATIENT
Start: 2019-10-10 | End: 2019-10-13 | Stop reason: HOSPADM

## 2019-10-10 RX ORDER — LIDOCAINE 40 MG/G
CREAM TOPICAL
Status: DISCONTINUED | OUTPATIENT
Start: 2019-10-10 | End: 2019-10-13 | Stop reason: HOSPADM

## 2019-10-10 RX ORDER — LANOLIN 100 %
OINTMENT (GRAM) TOPICAL
Status: DISCONTINUED | OUTPATIENT
Start: 2019-10-10 | End: 2019-10-13 | Stop reason: HOSPADM

## 2019-10-10 RX ORDER — ONDANSETRON 2 MG/ML
4 INJECTION INTRAMUSCULAR; INTRAVENOUS EVERY 6 HOURS PRN
Status: DISCONTINUED | OUTPATIENT
Start: 2019-10-10 | End: 2019-10-13 | Stop reason: HOSPADM

## 2019-10-10 RX ORDER — KETOROLAC TROMETHAMINE 30 MG/ML
INJECTION, SOLUTION INTRAMUSCULAR; INTRAVENOUS
Status: COMPLETED
Start: 2019-10-10 | End: 2019-10-10

## 2019-10-10 RX ORDER — SODIUM CHLORIDE, SODIUM LACTATE, POTASSIUM CHLORIDE, CALCIUM CHLORIDE 600; 310; 30; 20 MG/100ML; MG/100ML; MG/100ML; MG/100ML
INJECTION, SOLUTION INTRAVENOUS CONTINUOUS
Status: DISCONTINUED | OUTPATIENT
Start: 2019-10-10 | End: 2019-10-10 | Stop reason: HOSPADM

## 2019-10-10 RX ORDER — OXYTOCIN 10 [USP'U]/ML
10 INJECTION, SOLUTION INTRAMUSCULAR; INTRAVENOUS
Status: DISCONTINUED | OUTPATIENT
Start: 2019-10-10 | End: 2019-10-13 | Stop reason: HOSPADM

## 2019-10-10 RX ORDER — OXYTOCIN/0.9 % SODIUM CHLORIDE 30/500 ML
PLASTIC BAG, INJECTION (ML) INTRAVENOUS CONTINUOUS PRN
Status: DISCONTINUED | OUTPATIENT
Start: 2019-10-10 | End: 2019-10-10

## 2019-10-10 RX ORDER — KETOROLAC TROMETHAMINE 30 MG/ML
30 INJECTION, SOLUTION INTRAMUSCULAR; INTRAVENOUS EVERY 6 HOURS
Status: DISPENSED | OUTPATIENT
Start: 2019-10-10 | End: 2019-10-11

## 2019-10-10 RX ORDER — METOCLOPRAMIDE HYDROCHLORIDE 5 MG/ML
10 INJECTION INTRAMUSCULAR; INTRAVENOUS EVERY 6 HOURS PRN
Status: DISCONTINUED | OUTPATIENT
Start: 2019-10-10 | End: 2019-10-13 | Stop reason: HOSPADM

## 2019-10-10 RX ORDER — OXYTOCIN/0.9 % SODIUM CHLORIDE 30/500 ML
340 PLASTIC BAG, INJECTION (ML) INTRAVENOUS CONTINUOUS PRN
Status: DISCONTINUED | OUTPATIENT
Start: 2019-10-10 | End: 2019-10-13 | Stop reason: HOSPADM

## 2019-10-10 RX ORDER — OXYTOCIN/0.9 % SODIUM CHLORIDE 30/500 ML
PLASTIC BAG, INJECTION (ML) INTRAVENOUS
Status: DISCONTINUED
Start: 2019-10-10 | End: 2019-10-10 | Stop reason: HOSPADM

## 2019-10-10 RX ORDER — FENTANYL CITRATE 50 UG/ML
INJECTION, SOLUTION INTRAMUSCULAR; INTRAVENOUS PRN
Status: DISCONTINUED | OUTPATIENT
Start: 2019-10-10 | End: 2019-10-10

## 2019-10-10 RX ORDER — CITRIC ACID/SODIUM CITRATE 334-500MG
30 SOLUTION, ORAL ORAL
Status: COMPLETED | OUTPATIENT
Start: 2019-10-10 | End: 2019-10-10

## 2019-10-10 RX ORDER — ACETAMINOPHEN 325 MG/1
650 TABLET ORAL EVERY 4 HOURS PRN
Status: DISCONTINUED | OUTPATIENT
Start: 2019-10-13 | End: 2019-10-13 | Stop reason: HOSPADM

## 2019-10-10 RX ORDER — LABETALOL 200 MG/1
200 TABLET, FILM COATED ORAL EVERY 12 HOURS SCHEDULED
Status: DISCONTINUED | OUTPATIENT
Start: 2019-10-10 | End: 2019-10-13 | Stop reason: HOSPADM

## 2019-10-10 RX ORDER — ACETAMINOPHEN 325 MG/1
975 TABLET ORAL ONCE
Status: COMPLETED | OUTPATIENT
Start: 2019-10-10 | End: 2019-10-10

## 2019-10-10 RX ORDER — IBUPROFEN 400 MG/1
800 TABLET, FILM COATED ORAL EVERY 6 HOURS PRN
Status: DISCONTINUED | OUTPATIENT
Start: 2019-10-11 | End: 2019-10-13 | Stop reason: HOSPADM

## 2019-10-10 RX ORDER — EPHEDRINE SULFATE 50 MG/ML
5 INJECTION, SOLUTION INTRAMUSCULAR; INTRAVENOUS; SUBCUTANEOUS
Status: DISCONTINUED | OUTPATIENT
Start: 2019-10-10 | End: 2019-10-11

## 2019-10-10 RX ORDER — OXYCODONE HYDROCHLORIDE 5 MG/1
5-10 TABLET ORAL
Status: DISCONTINUED | OUTPATIENT
Start: 2019-10-10 | End: 2019-10-13 | Stop reason: HOSPADM

## 2019-10-10 RX ORDER — KETOROLAC TROMETHAMINE 30 MG/ML
INJECTION, SOLUTION INTRAMUSCULAR; INTRAVENOUS PRN
Status: DISCONTINUED | OUTPATIENT
Start: 2019-10-10 | End: 2019-10-10

## 2019-10-10 RX ORDER — MISOPROSTOL 200 UG/1
400 TABLET ORAL
Status: DISCONTINUED | OUTPATIENT
Start: 2019-10-10 | End: 2019-10-13 | Stop reason: HOSPADM

## 2019-10-10 RX ORDER — DEXTROSE, SODIUM CHLORIDE, SODIUM LACTATE, POTASSIUM CHLORIDE, AND CALCIUM CHLORIDE 5; .6; .31; .03; .02 G/100ML; G/100ML; G/100ML; G/100ML; G/100ML
INJECTION, SOLUTION INTRAVENOUS CONTINUOUS
Status: DISCONTINUED | OUTPATIENT
Start: 2019-10-10 | End: 2019-10-13 | Stop reason: HOSPADM

## 2019-10-10 RX ORDER — MORPHINE SULFATE 1 MG/ML
150 INJECTION, SOLUTION EPIDURAL; INTRATHECAL; INTRAVENOUS ONCE
Status: DISCONTINUED | OUTPATIENT
Start: 2019-10-10 | End: 2019-10-11

## 2019-10-10 RX ORDER — NALBUPHINE HYDROCHLORIDE 10 MG/ML
2.5-5 INJECTION, SOLUTION INTRAMUSCULAR; INTRAVENOUS; SUBCUTANEOUS EVERY 6 HOURS PRN
Status: DISCONTINUED | OUTPATIENT
Start: 2019-10-10 | End: 2019-10-11

## 2019-10-10 RX ORDER — AMOXICILLIN 250 MG
1 CAPSULE ORAL 2 TIMES DAILY PRN
Status: DISCONTINUED | OUTPATIENT
Start: 2019-10-10 | End: 2019-10-13 | Stop reason: HOSPADM

## 2019-10-10 RX ORDER — ACETAMINOPHEN 325 MG/1
975 TABLET ORAL EVERY 8 HOURS
Status: DISPENSED | OUTPATIENT
Start: 2019-10-10 | End: 2019-10-13

## 2019-10-10 RX ORDER — LIDOCAINE 40 MG/G
CREAM TOPICAL
Status: DISCONTINUED | OUTPATIENT
Start: 2019-10-10 | End: 2019-10-10 | Stop reason: HOSPADM

## 2019-10-10 RX ORDER — CEFAZOLIN SODIUM 1 G/3ML
1 INJECTION, POWDER, FOR SOLUTION INTRAMUSCULAR; INTRAVENOUS SEE ADMIN INSTRUCTIONS
Status: DISCONTINUED | OUTPATIENT
Start: 2019-10-10 | End: 2019-10-10 | Stop reason: HOSPADM

## 2019-10-10 RX ORDER — HYDROCORTISONE 2.5 %
CREAM (GRAM) TOPICAL 3 TIMES DAILY PRN
Status: DISCONTINUED | OUTPATIENT
Start: 2019-10-10 | End: 2019-10-13 | Stop reason: HOSPADM

## 2019-10-10 RX ORDER — NIFEDIPINE 30 MG/1
30 TABLET, EXTENDED RELEASE ORAL DAILY
Status: DISCONTINUED | OUTPATIENT
Start: 2019-10-10 | End: 2019-10-13 | Stop reason: HOSPADM

## 2019-10-10 RX ORDER — NALOXONE HYDROCHLORIDE 0.4 MG/ML
.1-.4 INJECTION, SOLUTION INTRAMUSCULAR; INTRAVENOUS; SUBCUTANEOUS
Status: DISCONTINUED | OUTPATIENT
Start: 2019-10-10 | End: 2019-10-13 | Stop reason: HOSPADM

## 2019-10-10 RX ORDER — CITALOPRAM HYDROBROMIDE 40 MG/1
40 TABLET ORAL DAILY
Status: DISCONTINUED | OUTPATIENT
Start: 2019-10-10 | End: 2019-10-13 | Stop reason: HOSPADM

## 2019-10-10 RX ORDER — ACETAMINOPHEN 325 MG/1
TABLET ORAL
Status: COMPLETED
Start: 2019-10-10 | End: 2019-10-10

## 2019-10-10 RX ORDER — FENTANYL CITRATE 50 UG/ML
INJECTION, SOLUTION INTRAMUSCULAR; INTRAVENOUS
Status: DISCONTINUED
Start: 2019-10-10 | End: 2019-10-10 | Stop reason: HOSPADM

## 2019-10-10 RX ORDER — POLYETHYLENE GLYCOL 3350 17 G/17G
17 POWDER, FOR SOLUTION ORAL DAILY PRN
Status: DISCONTINUED | OUTPATIENT
Start: 2019-10-10 | End: 2019-10-13 | Stop reason: HOSPADM

## 2019-10-10 RX ORDER — CEFAZOLIN SODIUM 2 G/100ML
2 INJECTION, SOLUTION INTRAVENOUS
Status: COMPLETED | OUTPATIENT
Start: 2019-10-10 | End: 2019-10-10

## 2019-10-10 RX ADMIN — BUPIVACAINE HYDROCHLORIDE IN DEXTROSE 12 MG: 7.5 INJECTION, SOLUTION SUBARACHNOID at 13:13

## 2019-10-10 RX ADMIN — MORPHINE SULFATE 0.15 MG: 1 INJECTION, SOLUTION EPIDURAL; INTRATHECAL; INTRAVENOUS at 13:13

## 2019-10-10 RX ADMIN — FENTANYL CITRATE 50 MCG: 50 INJECTION, SOLUTION INTRAMUSCULAR; INTRAVENOUS at 13:56

## 2019-10-10 RX ADMIN — SODIUM CITRATE AND CITRIC ACID MONOHYDRATE 30 ML: 500; 334 SOLUTION ORAL at 13:03

## 2019-10-10 RX ADMIN — PHENYLEPHRINE HYDROCHLORIDE 0.1 MCG/KG/MIN: 10 INJECTION INTRAVENOUS at 13:25

## 2019-10-10 RX ADMIN — CEFAZOLIN SODIUM 2 G: 2 INJECTION, SOLUTION INTRAVENOUS at 13:10

## 2019-10-10 RX ADMIN — KETOROLAC TROMETHAMINE 30 MG: 30 INJECTION, SOLUTION INTRAMUSCULAR at 14:14

## 2019-10-10 RX ADMIN — ACETAMINOPHEN 975 MG: 325 TABLET, FILM COATED ORAL at 19:48

## 2019-10-10 RX ADMIN — SODIUM CHLORIDE, POTASSIUM CHLORIDE, SODIUM LACTATE AND CALCIUM CHLORIDE 1000 ML: 600; 310; 30; 20 INJECTION, SOLUTION INTRAVENOUS at 11:35

## 2019-10-10 RX ADMIN — Medication 340 ML/HR: at 13:40

## 2019-10-10 RX ADMIN — ONDANSETRON 4 MG: 2 INJECTION INTRAMUSCULAR; INTRAVENOUS at 13:16

## 2019-10-10 RX ADMIN — SODIUM CHLORIDE, POTASSIUM CHLORIDE, SODIUM LACTATE AND CALCIUM CHLORIDE: 600; 310; 30; 20 INJECTION, SOLUTION INTRAVENOUS at 12:14

## 2019-10-10 RX ADMIN — NIFEDIPINE 30 MG: 30 TABLET, FILM COATED, EXTENDED RELEASE ORAL at 19:49

## 2019-10-10 RX ADMIN — LABETALOL HYDROCHLORIDE 200 MG: 200 TABLET, FILM COATED ORAL at 19:49

## 2019-10-10 RX ADMIN — CITALOPRAM HYDROBROMIDE 40 MG: 40 TABLET ORAL at 19:48

## 2019-10-10 RX ADMIN — LAMOTRIGINE 100 MG: 100 TABLET ORAL at 19:49

## 2019-10-10 RX ADMIN — KETOROLAC TROMETHAMINE 30 MG: 30 INJECTION, SOLUTION INTRAMUSCULAR at 19:49

## 2019-10-10 RX ADMIN — ACETAMINOPHEN 975 MG: 325 TABLET, FILM COATED ORAL at 12:17

## 2019-10-10 RX ADMIN — Medication 100 ML/HR: at 18:06

## 2019-10-10 RX ADMIN — ACETAMINOPHEN 975 MG: 325 TABLET ORAL at 12:17

## 2019-10-10 ASSESSMENT — LIFESTYLE VARIABLES: TOBACCO_USE: 0

## 2019-10-10 ASSESSMENT — ACTIVITIES OF DAILY LIVING (ADL)
TRANSFERRING: 0-->INDEPENDENT
RETIRED_COMMUNICATION: 0-->UNDERSTANDS/COMMUNICATES WITHOUT DIFFICULTY
COGNITION: 0 - NO COGNITION ISSUES REPORTED
BATHING: 0-->INDEPENDENT
DRESS: 0-->INDEPENDENT
SWALLOWING: 0-->SWALLOWS FOODS/LIQUIDS WITHOUT DIFFICULTY
FALL_HISTORY_WITHIN_LAST_SIX_MONTHS: NO
TOILETING: 0-->INDEPENDENT
RETIRED_EATING: 0-->INDEPENDENT
AMBULATION: 0-->INDEPENDENT

## 2019-10-10 ASSESSMENT — MIFFLIN-ST. JEOR: SCORE: 1763.63

## 2019-10-10 NOTE — BRIEF OP NOTE
[facility] Brief Operative Note    Pre-operative diagnosis: PREVIOUS  section, desires repeat   CHRONIC HYPERTENSION with worsening BP's  IUP at 37 weeks   Post-operative diagnosis Same   Procedure: Procedure(s):  REPEAT  SECTION   Surgeon(s): Surgeon(s) and Role:     * Angelica Rubin MD - Primary     * Faith Mcadams PA-C - Assisting   Drains: scott  Anesthesia: spinal  Complications: none   Estimated blood loss: 230cc   Specimens: ID Type Source Tests Collected by Time Destination   1 :  Cord blood Blood OR DOCUMENTATION ONLY Aneglica Rubin MD 10/10/2019  1:39 PM    2 :  Tissue Umbilical Cord SEE PROVIDERS ORDERS Angelica Rubin MD 10/10/2019  1:40 PM    3 :  Placenta Placenta SPECIMEN DISCARDED Angelica Rubin MD 10/10/2019  1:50 PM       Findings: 6#4 oz male in vertex presentation, no nuchal cord.apgars 7,9  Clear fluid  Normal tubes uterus and ovaries  Dense adhesions of fascia to rectus muscles and rectus muscles in the midline   Repeat labs ordered for am.  Labetolol 200mg bid and nifedipine XR 30mg daily ordered to start today

## 2019-10-10 NOTE — PLAN OF CARE
Pt oriented to room, unit, routines, infant safety, educational material, and pumping and log. Baby in NICU.

## 2019-10-10 NOTE — ANESTHESIA POSTPROCEDURE EVALUATION
Patient: Hilda Ghosh    Procedure(s):  REPEAT  SECTION    Diagnosis:PREVIOUS ; CHRONIC HYPERTENSION  Diagnosis Additional Information: No value filed.    Anesthesia Type:  Spinal    Note:  Anesthesia Post Evaluation    Patient location during evaluation: OB PACU  Patient participation: Able to participate in evaluation but full recovery from regional anesthesia has not yet ocurrred but is anticipated to occur within 48 hours  Level of consciousness: awake and alert  Pain management: adequate  Airway patency: patent  Cardiovascular status: acceptable  Respiratory status: acceptable  Hydration status: acceptable  PONV: none     Anesthetic complications: None          Last vitals:  Vitals:    10/10/19 1445 10/10/19 1500 10/10/19 1515   BP: (!) 143/76 (!) 148/88 (!) 143/97   Pulse: 64 63 65   Resp: 8 10 20   Temp:      SpO2: 96% 96% 98%         Electronically Signed By: Stefan Freeman MD  October 10, 2019  3:25 PM

## 2019-10-10 NOTE — PLAN OF CARE
Data: Hilda Ghosh transferred to [facility] via wheelchair at 1650. Baby in NICU.  Action: Receiving unit notified of transfer: Yes. Patient and family notified of room change. Report given to Cheli MARTINEZ RN at 1650. Belongings sent to receiving unit. Accompanied by Registered Nurse. Oriented patient to surroundings. Call light within reach. ID bands double-checked with receiving RN.  Response: Patient tolerated transfer and is stable.

## 2019-10-10 NOTE — ANESTHESIA PROCEDURE NOTES
Peripheral nerve/Neuraxial procedure note : intrathecal  Pre-Procedure  Performed by Stefan Freeman MD  Location: OR      Pre-Anesthestic Checklist: patient identified, IV checked, site marked, risks and benefits discussed, informed consent, monitors and equipment checked, pre-op evaluation, at physician/surgeon's request and post-op pain management    Timeout  Correct Patient: Yes   Correct Procedure: Yes   Correct Site: Yes   Correct Laterality: N/A   Correct Position: Yes   Site Marked: N/A   .   Procedure Documentation  ASA 2  .    Procedure: intrathecal, .   Patient Position:sitting Insertion Site:L4-5  (midline approach)     Patient Prep/Sterile Barriers; mask, sterile gloves, chlorhexidine gluconate and isopropyl alcohol, patient draped.  .  Needle:  Spinal Needle (gauge): 25  Spinal/LP Needle Length (inches): 3.5 Introducer used Introducer: 20 G .        Assessment/Narrative  Paresthesias: No.  .  .  clear CSF fluid removed . Comments:  Patient tolerated well.  Pre and post aspiration.  No complications.  0.75% Bupivacaine and Duramorph documented in record.

## 2019-10-10 NOTE — OP NOTE
Procedure Date: 10/10/2019      PREOPERATIVE DIAGNOSES:   1.  Previous  section, desires repeat.   2.  Intrauterine pregnancy at 37 weeks.   3.  Chronic hypertension with recent worsening blood pressures.      POSTOPERATIVE DIAGNOSES:   1.  Previous  section, desires repeat.   2.  Intrauterine pregnancy at 37 weeks.     3.  Chronic hypertension with recent worsening blood pressures.      PROCEDURE:  Repeat low transverse  section with a 2-layer closure.      SURGEON:  Angelica Rubin MD.      ASSISTANT:  Faith Mcadams PA-C, and OR staff.      DRAINS:  Fernandes.      ANESTHESIA:  Spinal.      COMPLICATIONS:  None.      QUANTITATIVE BLOOD LOSS:  230 mL.      SPECIMENS REMOVED:  Cord blood was sent to lab and umbilical cord to lab.  Placenta was discarded.      FINDINGS:  The infant was a 6 pound 4 ounce male in the vertex presentation with no nuchal cord, with Apgars of 7 at 1 minute and 9 at 5 minutes.  Amniotic fluid was noted to be clear.  The tubes, uterus and ovaries appeared normal.  There were dense adhesions of the fascia to the rectus muscles and between the rectus muscles in the midline.       INDICATIONS FOR PROCEDURE:  Hilda is a 34-year-old X1W3-4-8-9 at 37 weeks gestational age with an estimated due date of 10/31/2019.  Her pregnancy was the result of in vitro fertilization with donor eggs.  She has a history of chronic hypertension and has been managed on labetalol.  Over the last 4 weeks her blood pressure has been increasing.  PIH labs were normal with the exception of mildly increased proteinuria on a 24-hour urine.  She has also developed increasing headaches.  Given the worsening blood pressures, I recommended delivery at 37 weeks.  She did receive 2 doses of betamethasone at 36-1/2 weeks.  The risks, benefits and possible complications as well as the option of a trial of labor were discussed with the patient, and she desires to proceed with repeat  section.  Consent  was signed.      DESCRIPTION OF TECHNIQUE:  The patient was brought to the operating room and spinal anesthetic was induced without complication.  The patient was placed in the left lateral tilt supine position and a Fernandes catheter was placed.  The patient was then prepped and draped in the usual sterile fashion.  Surgical timeout was then performed, identifying the correct patient and procedure.  An adequate level of anesthesia was assured with a pinch test.  A Pfannenstiel incision was made in elliptical fashion excising the old previous skin incision.  Dissection was carried out to the level of fascia sharply.  The fascia was incised in the midline and carefully dissected free from the underlying rectus muscles, primarily sharply as blunt dissection was really not possible given the adhesions.  A small opening in the rectus muscles was made superiorly and the peritoneum was entered bluntly without complication.  The rectus muscles were not able to be bluntly  in the midline.  Therefore, these were carefully dissected sharply.  The peritoneum was then stretched.  The bladder flap was then created by dissection of the anterior reflection of the peritoneum off the lower uterine segment, both bluntly and sharply.  The bladder blade was then placed, retracting the bladder off of the lower uterine segment.  A transverse hysterotomy was made in the lower uterine segment and the incision was extended laterally and superiorly bluntly.  The amniotic membranes were ruptured and the fluid was noted to be clear.  The head was elevated and delivered through the hysterotomy; however, the rectus muscles and fascia were noted to be very tight.  I did loosen these with the bandage scissors; however, I was still unable to deliver the head with just fundal pressure.  Therefore, a Kiwi cup vacuum was placed on the vertex, and with 1 easy pull, the head was delivered.  The Kiwi vacuum was removed.  The shoulders followed by  the remainder of the baby delivered easily.  The mouth and nose were bulb suctioned.  Delayed cord clamping was performed for 40 seconds.  The cord was clamped and cut and the infant was handed to waiting pediatrics nurse.        Segment of cord was obtained followed by cord blood.  The fundus of the uterus was massaged and the placenta was removed.  The uterus was exteriorized and cleaned of membrane and clot.  Pitocin was administered through the running IV.  Hysterotomy was closed in running locked fashion with 0 Vicryl.  A second running imbricating layer of 0 Vicryl was then placed.  Additional figure-of-eight sutures were placed in the right lateral aspect of the incision for adequate hemostasis.  The cul-de-sac was then irrigated and suctioned.  The uterus was replaced into the peritoneal cavity.  There was still a small area of bleeding on the right lateral aspect of the hysterotomy and 1 additional figure-of-eight suture of 0 Vicryl was placed and adequate hemostasis was assured.  Adequate hemostasis was assured along the rest of hysterotomy.  The pericolic gutters were irrigated and suctioned.  The peritoneum was then closed in running fashion with 3-0 chromic.  The rectus muscles were reapproximated in the midline with 0 Vicryl in interrupted fashion.  The fascia was closed in running fashion with 0 PDS.  The subcutaneous tissue was irrigated and suctioned and closed in running fashion with 3-0 Vicryl.  The skin was closed with staples.  Steri-Strips and a sterile dressing were applied.  The patient tolerated the procedure well and was brought to the recovery room in stable condition.  All final counts were correct.         ALICJA PEÑA MD             D: 10/10/2019   T: 10/10/2019   MT: ELA      Name:     JONATHAN MICHEL   MRN:      [id number]        Account:        [id number]   :      1985           Procedure Date: 10/10/2019      Document: [id number]

## 2019-10-10 NOTE — PLAN OF CARE
Multip here for Repeat . Admission assessment completed, preop cares and teaching done. Patient questions answered. Preop labs drawn  And preop cares completed.

## 2019-10-10 NOTE — ANESTHESIA PREPROCEDURE EVALUATION
Anesthesia Pre-Procedure Evaluation    Patient: Hilda Ghosh   MRN: [id number] : 1985          Preoperative Diagnosis: PREVIOUS ; CHRONIC HYPERTENSION    Procedure(s):  REPEAT  SECTION    Past Medical History:   Diagnosis Date     Anxiety      Depression      Depressive disorder     on meds     Fibroid      History of blood transfusion     Blood transfusion x2 in 10/2017 for post-partum anemia (denies PPH)     History of miscarriage      Hypertension     Chronic HTN     Infertility, female      Past Surgical History:   Procedure Laterality Date      SECTION       DILATION AND CURETTAGE SUCTION N/A 2016    Procedure: DILATION AND CURETTAGE SUCTION;  Surgeon: Osmany Ledezma MD;  Location:  [facility]     finger reattachment      left ring      GYN SURGERY      D&C        Anesthesia Evaluation     . Pt has had prior anesthetic.     No history of anesthetic complications          ROS/MED HX    ENT/Pulmonary:      (-) tobacco use and sleep apnea   Neurologic:       Cardiovascular:     (+) hypertension----. : . . . :. .       METS/Exercise Tolerance:     Hematologic:         Musculoskeletal:         GI/Hepatic:     (+) GERD       Renal/Genitourinary:         Endo:      (-) Type II DM   Psychiatric: Comment: ADD    (+) psychiatric history anxiety and depression      Infectious Disease:         Malignancy:         Other:                          Physical Exam  Normal systems: dental    Airway   Mallampati: II  TM distance: >3 FB  Neck ROM: full    Dental     Cardiovascular   Rhythm and rate: regular  (-) no murmur    Pulmonary             Lab Results   Component Value Date    WBC 8.0 10/02/2019    HGB 12.7 10/02/2019    HCT 38.0 10/02/2019     10/02/2019     2018    POTASSIUM 3.9 2018    CHLORIDE 105 2018    CO2 24 2018    BUN 18 2018    CR 0.55 10/10/2019     (H) 2018    JUAN 9.2 2018    MAG 1.8 2018    ALT 22 10/10/2019     "AST 14 10/10/2019    HCGS Negative 11/18/2018       Preop Vitals  BP Readings from Last 3 Encounters:   10/10/19 (!) 144/92   10/02/19 134/79   09/30/19 (!) 142/79    Pulse Readings from Last 3 Encounters:   10/10/19 67   10/02/19 73   09/30/19 70      Resp Readings from Last 3 Encounters:   10/10/19 18   10/02/19 16   09/30/19 14    SpO2 Readings from Last 3 Encounters:   07/23/19 94%   11/26/18 97%   11/18/18 99%      Temp Readings from Last 1 Encounters:   10/10/19 36.6  C (97.9  F) (Temporal)    Ht Readings from Last 1 Encounters:   10/10/19 1.727 m (5' 8\")      Wt Readings from Last 1 Encounters:   10/10/19 102.5 kg (226 lb)    Estimated body mass index is 34.36 kg/m  as calculated from the following:    Height as of this encounter: 1.727 m (5' 8\").    Weight as of this encounter: 102.5 kg (226 lb).       Anesthesia Plan      History & Physical Review  History and physical reviewed and following examination; no interval change.    ASA Status:  2 .    NPO Status:  > 8 hours    Plan for Spinal   PONV prophylaxis:  Ondansetron (or other 5HT-3)       Postoperative Care  Postoperative pain management:  Multi-modal analgesia.      Consents  Anesthetic plan, risks, benefits and alternatives discussed with:  Patient.  Use of blood products discussed: Yes.   Consented to blood products.  .                 Stefan Freeman MD  "

## 2019-10-10 NOTE — ANESTHESIA CARE TRANSFER NOTE
Patient: Hilda Ghosh    Procedure(s):  REPEAT  SECTION    Diagnosis: PREVIOUS ; CHRONIC HYPERTENSION  Diagnosis Additional Information: No value filed.    Anesthesia Type:   Spinal     Note:  Airway :Room Air  Patient transferred to:PACU  Handoff Report: Identifed the Patient, Identified the Reponsible Provider, Reviewed the pertinent medical history, Discussed the surgical course, Reviewed Intra-OP anesthesia mangement and issues during anesthesia, Set expectations for post-procedure period and Allowed opportunity for questions and acknowledgement of understanding      Vitals: (Last set prior to Anesthesia Care Transfer)    CRNA VITALS  10/10/2019 1348 - 10/10/2019 1419      10/10/2019             Pulse:  71    SpO2:  98 %    Resp Rate (set):  10                Electronically Signed By: CHLOE Caputo CRNA  October 10, 2019  2:19 PM

## 2019-10-11 LAB
ANION GAP SERPL CALCULATED.3IONS-SCNC: 3 MMOL/L (ref 3–14)
BUN SERPL-MCNC: 10 MG/DL (ref 7–30)
CALCIUM SERPL-MCNC: 9 MG/DL (ref 8.5–10.1)
CHLORIDE SERPL-SCNC: 107 MMOL/L (ref 94–109)
CO2 SERPL-SCNC: 28 MMOL/L (ref 20–32)
CREAT SERPL-MCNC: 0.65 MG/DL (ref 0.52–1.04)
ERYTHROCYTE [DISTWIDTH] IN BLOOD BY AUTOMATED COUNT: 13.2 % (ref 10–15)
GFR SERPL CREATININE-BSD FRML MDRD: >90 ML/MIN/{1.73_M2}
GLUCOSE SERPL-MCNC: 98 MG/DL (ref 70–99)
HCT VFR BLD AUTO: 38.2 % (ref 35–47)
HGB BLD-MCNC: 12.7 G/DL (ref 11.7–15.7)
MCH RBC QN AUTO: 30.9 PG (ref 26.5–33)
MCHC RBC AUTO-ENTMCNC: 33.2 G/DL (ref 31.5–36.5)
MCV RBC AUTO: 93 FL (ref 78–100)
PLATELET # BLD AUTO: 230 10E9/L (ref 150–450)
POTASSIUM SERPL-SCNC: 4 MMOL/L (ref 3.4–5.3)
RBC # BLD AUTO: 4.11 10E12/L (ref 3.8–5.2)
SODIUM SERPL-SCNC: 138 MMOL/L (ref 133–144)
T PALLIDUM AB SER QL: NONREACTIVE
WBC # BLD AUTO: 11.8 10E9/L (ref 4–11)

## 2019-10-11 PROCEDURE — 25000128 H RX IP 250 OP 636: Performed by: OBSTETRICS & GYNECOLOGY

## 2019-10-11 PROCEDURE — 85027 COMPLETE CBC AUTOMATED: CPT | Performed by: OBSTETRICS & GYNECOLOGY

## 2019-10-11 PROCEDURE — 80048 BASIC METABOLIC PNL TOTAL CA: CPT | Performed by: OBSTETRICS & GYNECOLOGY

## 2019-10-11 PROCEDURE — 25000132 ZZH RX MED GY IP 250 OP 250 PS 637: Performed by: OBSTETRICS & GYNECOLOGY

## 2019-10-11 PROCEDURE — 12000035 ZZH R&B POSTPARTUM

## 2019-10-11 PROCEDURE — 36415 COLL VENOUS BLD VENIPUNCTURE: CPT | Performed by: OBSTETRICS & GYNECOLOGY

## 2019-10-11 RX ADMIN — IBUPROFEN 800 MG: 400 TABLET ORAL at 14:58

## 2019-10-11 RX ADMIN — NIFEDIPINE 30 MG: 30 TABLET, FILM COATED, EXTENDED RELEASE ORAL at 20:07

## 2019-10-11 RX ADMIN — KETOROLAC TROMETHAMINE 30 MG: 30 INJECTION, SOLUTION INTRAMUSCULAR at 02:16

## 2019-10-11 RX ADMIN — ACETAMINOPHEN 975 MG: 325 TABLET, FILM COATED ORAL at 11:22

## 2019-10-11 RX ADMIN — SENNOSIDES AND DOCUSATE SODIUM 2 TABLET: 8.6; 5 TABLET ORAL at 21:31

## 2019-10-11 RX ADMIN — SODIUM CHLORIDE, SODIUM LACTATE, POTASSIUM CHLORIDE, CALCIUM CHLORIDE AND DEXTROSE MONOHYDRATE: 5; 600; 310; 30; 20 INJECTION, SOLUTION INTRAVENOUS at 00:22

## 2019-10-11 RX ADMIN — LAMOTRIGINE 100 MG: 100 TABLET ORAL at 20:07

## 2019-10-11 RX ADMIN — ACETAMINOPHEN 975 MG: 325 TABLET, FILM COATED ORAL at 20:06

## 2019-10-11 RX ADMIN — ACETAMINOPHEN 975 MG: 325 TABLET, FILM COATED ORAL at 04:06

## 2019-10-11 RX ADMIN — CITALOPRAM HYDROBROMIDE 40 MG: 40 TABLET ORAL at 20:09

## 2019-10-11 RX ADMIN — LABETALOL HYDROCHLORIDE 200 MG: 200 TABLET, FILM COATED ORAL at 08:21

## 2019-10-11 RX ADMIN — LABETALOL HYDROCHLORIDE 200 MG: 200 TABLET, FILM COATED ORAL at 20:08

## 2019-10-11 RX ADMIN — OXYCODONE HYDROCHLORIDE 5 MG: 5 TABLET ORAL at 16:17

## 2019-10-11 RX ADMIN — KETOROLAC TROMETHAMINE 30 MG: 30 INJECTION, SOLUTION INTRAMUSCULAR at 08:21

## 2019-10-11 RX ADMIN — IBUPROFEN 800 MG: 400 TABLET ORAL at 20:58

## 2019-10-11 RX ADMIN — SENNOSIDES AND DOCUSATE SODIUM 1 TABLET: 8.6; 5 TABLET ORAL at 08:22

## 2019-10-11 NOTE — PLAN OF CARE
VSS. Fernandes patent w/ adequate output. Scant vag bleeding. Incision dressing CDI. Pain controlled w/ toradol and tylenol. Using own breast pump on floor. Went down to see infant in NICU x1  overnight. Tolerated transfer very well.

## 2019-10-11 NOTE — PLAN OF CARE
Patient taking scheduled Tylenol and Toradol for pain with adequate pain relief. Patient denies the need for additional pain medication at this time. Patient ambulating in room independently. Patient's catheter removed, patient able to void without difficulty. Patient pumping and visiting infant in NICU. Encouraged patient to call with needs/questions. Call light within reach, will continue to monitor.

## 2019-10-11 NOTE — PROGRESS NOTES
"Postpartum Progress Note  Hilda Ghosh  [id number]    POD#1    Subjective:   Doing well this morning. Has already been up ambulating back and forth from the NICU. Feels pain is well controlled. Fernandes still in place. No flatus or bowel movement yet. Has been tolerating regular diet without nausea.     Baby doing well in NICU. Hopeful he can come back upstairs today. Breast pumping which is going well.    Objective:  /78   Pulse 65   Temp 98.1  F (36.7  C) (Oral)   Resp 18   Ht 1.727 m (5' 8\")   Wt 102.5 kg (226 lb)   LMP 2019   SpO2 99%   Breastfeeding? Unknown   BMI 34.36 kg/m      I/O last 3 completed shifts:  In:  [P.O.:200; I.V.:]  Out:  [Urine:1425; Blood:230]    General: NAD, sitting comfortably, alert  Heart: regular rate  Lungs: nonlabored breathing  Abdomen: soft, non distended, non tender to palpation. Fundus firm below U.   Incision: covered by dressing that is clean and dry  Extremities: warm and well perfused, trace edema in LE, nontender    Labs:  Component      Latest Ref Rng & Units 10/11/2019   Sodium      133 - 144 mmol/L 138   Potassium      3.4 - 5.3 mmol/L 4.0   Chloride      94 - 109 mmol/L 107   Carbon Dioxide      20 - 32 mmol/L 28   Anion Gap      3 - 14 mmol/L 3   Glucose      70 - 99 mg/dL 98   Urea Nitrogen      7 - 30 mg/dL 10   Creatinine      0.52 - 1.04 mg/dL 0.65   GFR Estimate      >60 mL/min/1.73:m2 >90   GFR Estimate If Black      >60 mL/min/1.73:m2 >90   Calcium      8.5 - 10.1 mg/dL 9.0   WBC      4.0 - 11.0 10e9/L 11.8 (H)   RBC Count      3.8 - 5.2 10e12/L 4.11   Hemoglobin      11.7 - 15.7 g/dL 12.7   Hematocrit      35.0 - 47.0 % 38.2   MCV      78 - 100 fl 93   MCH      26.5 - 33.0 pg 30.9   MCHC      31.5 - 36.5 g/dL 33.2   RDW      10.0 - 15.0 % 13.2   Platelet Count      150 - 450 10e9/L 230     Assessment/Plan:  Hilda Ghosh is a 34 year old  who is POD#1 s/p scheduled RLTCS. Currently stable and doing well postoperatively.    - " Routine post-op cares  - Pain well controlled with oral medications  - Chronic HTN with recent worsening BP: BPs elevatd overnight but normal this morning. Continue to monitor closely. Continue labetalol 200 mg BID and nifedipine 30 mg XL. Denies s/s of pre-eclampsia, HELLP labs normal on admission.   - Heme: Hgb 12.7 postoperatively. Will resume PNV with iron at discharge.   - Incision: remove bandage later today after shower.   - Rh pos, Rubella immune  - Baby: doing well in NICU  - Dispo: d/c to home on POD#3 or when meeting postoperative goals    Tessa Knowles MD  OB/GYN & Infertility  10/11/19

## 2019-10-11 NOTE — PLAN OF CARE
-VSS, Assessment WDL.  -Incision C/D/I, staples and steri strips intact.  -Up to bathroom independently, tolerated well.    -Pain well controlled, requesting PRN pain meds as needed. Increased pain this evening, patient more active, ambulating, Oxycodone administered per patient request.      - in NICU, will be coming to [facility] later this evening. FOB at bedside involved at bedside.   -On pathway, continue to monitor and notify MD as needed.

## 2019-10-11 NOTE — PLAN OF CARE
Pt ambulated to w/c, went down to NICU to see Gaudencio. Tolerated well. IV and scott patent. Regular diet with no N/V (chose to eat [employer]). Good pain control with tylenol and toradol. Pumping every 3 hrs.

## 2019-10-12 PROCEDURE — 12000035 ZZH R&B POSTPARTUM

## 2019-10-12 PROCEDURE — 25000132 ZZH RX MED GY IP 250 OP 250 PS 637: Performed by: OBSTETRICS & GYNECOLOGY

## 2019-10-12 RX ORDER — OXYCODONE HYDROCHLORIDE 5 MG/1
5-10 TABLET ORAL
Qty: 12 TABLET | Refills: 0 | Status: SHIPPED | OUTPATIENT
Start: 2019-10-12 | End: 2019-10-13

## 2019-10-12 RX ORDER — NIFEDIPINE 30 MG
30 TABLET, EXTENDED RELEASE ORAL DAILY
Qty: 60 TABLET | Refills: 0 | Status: SHIPPED | OUTPATIENT
Start: 2019-10-12

## 2019-10-12 RX ORDER — IBUPROFEN 800 MG/1
800 TABLET, FILM COATED ORAL EVERY 6 HOURS PRN
Qty: 30 TABLET | Refills: 0 | Status: SHIPPED | OUTPATIENT
Start: 2019-10-12

## 2019-10-12 RX ADMIN — IBUPROFEN 800 MG: 400 TABLET ORAL at 15:35

## 2019-10-12 RX ADMIN — LABETALOL HYDROCHLORIDE 200 MG: 200 TABLET, FILM COATED ORAL at 07:42

## 2019-10-12 RX ADMIN — IBUPROFEN 800 MG: 400 TABLET ORAL at 09:18

## 2019-10-12 RX ADMIN — ACETAMINOPHEN 975 MG: 325 TABLET, FILM COATED ORAL at 12:21

## 2019-10-12 RX ADMIN — LAMOTRIGINE 100 MG: 100 TABLET ORAL at 20:14

## 2019-10-12 RX ADMIN — OXYCODONE HYDROCHLORIDE 5 MG: 5 TABLET ORAL at 20:16

## 2019-10-12 RX ADMIN — SENNOSIDES AND DOCUSATE SODIUM 1 TABLET: 8.6; 5 TABLET ORAL at 20:13

## 2019-10-12 RX ADMIN — ACETAMINOPHEN 975 MG: 325 TABLET, FILM COATED ORAL at 04:10

## 2019-10-12 RX ADMIN — ACETAMINOPHEN 975 MG: 325 TABLET, FILM COATED ORAL at 20:14

## 2019-10-12 RX ADMIN — CITALOPRAM HYDROBROMIDE 40 MG: 40 TABLET ORAL at 20:13

## 2019-10-12 RX ADMIN — IBUPROFEN 800 MG: 400 TABLET ORAL at 21:29

## 2019-10-12 RX ADMIN — SENNOSIDES AND DOCUSATE SODIUM 2 TABLET: 8.6; 5 TABLET ORAL at 07:42

## 2019-10-12 RX ADMIN — OXYCODONE HYDROCHLORIDE 5 MG: 5 TABLET ORAL at 00:05

## 2019-10-12 RX ADMIN — IBUPROFEN 800 MG: 400 TABLET ORAL at 02:33

## 2019-10-12 RX ADMIN — NIFEDIPINE 30 MG: 30 TABLET, FILM COATED, EXTENDED RELEASE ORAL at 20:12

## 2019-10-12 RX ADMIN — LABETALOL HYDROCHLORIDE 200 MG: 200 TABLET, FILM COATED ORAL at 20:14

## 2019-10-12 NOTE — LACTATION NOTE
Initial visit:     Mom exclusively pumped and bottled with her 1st child. She plans on the same feeding plan with this infant. Mom does occassionally bring infant to breast. Assistance offered for observing a latch, mom declined at this time. Mom is pumping 15-20 ml and then supplementing with an additional 10ml of DBM.  Encouraged rooming in, skin to skin, feeding on demand 8-12x/day or sooner if baby cues.  No further questions at this time. Will follow as needed.   Nellie Motta RN

## 2019-10-12 NOTE — PLAN OF CARE
Patient taking Tylenol and Ibuprofen for pain with adequate pain relief. Patient declines the need for additional pain medication at this time. Patient ambulating independently, voiding without difficulty. Patient pumping q3hrs today. Encouraged patient to call with needs/questions. Call light within reach, will continue to monitor.

## 2019-10-12 NOTE — PLAN OF CARE
Vital signs stable.Incision open to air,staples and steri strips, intact and dry. Up independently, voiding without difficulty. Lung sounds clear and equal. Taking Tylenol,oxycodone and ibuprofen for pain management.Pumping every 3 hours. Encouraged to call with questions/concerns. Will continue to monitor.

## 2019-10-13 VITALS
DIASTOLIC BLOOD PRESSURE: 93 MMHG | HEIGHT: 68 IN | RESPIRATION RATE: 18 BRPM | OXYGEN SATURATION: 98 % | SYSTOLIC BLOOD PRESSURE: 146 MMHG | HEART RATE: 76 BPM | WEIGHT: 226 LBS | TEMPERATURE: 98 F | BODY MASS INDEX: 34.25 KG/M2

## 2019-10-13 PROCEDURE — 25000132 ZZH RX MED GY IP 250 OP 250 PS 637: Performed by: OBSTETRICS & GYNECOLOGY

## 2019-10-13 RX ORDER — OXYCODONE HYDROCHLORIDE 5 MG/1
5-10 TABLET ORAL
Qty: 12 TABLET | Refills: 0 | Status: SHIPPED | OUTPATIENT
Start: 2019-10-13 | End: 2024-05-27

## 2019-10-13 RX ADMIN — SIMETHICONE CHEW TAB 80 MG 80 MG: 80 TABLET ORAL at 09:32

## 2019-10-13 RX ADMIN — IBUPROFEN 800 MG: 400 TABLET ORAL at 05:29

## 2019-10-13 RX ADMIN — SENNOSIDES AND DOCUSATE SODIUM 2 TABLET: 8.6; 5 TABLET ORAL at 09:31

## 2019-10-13 RX ADMIN — LABETALOL HYDROCHLORIDE 200 MG: 200 TABLET, FILM COATED ORAL at 09:31

## 2019-10-13 RX ADMIN — ACETAMINOPHEN 975 MG: 325 TABLET, FILM COATED ORAL at 05:29

## 2019-10-13 NOTE — LACTATION NOTE
Routine and discharge visit.     Mom is pumping and bottling. Mom is getting about 40ml with each pumping session! Mom states she did put baby to breast overnight and he latched well and it was a pleasant experience. Mom exclusively pumped and bottled with her 1st, so breastfeeding is a new experience for her.  offered encouragement and support with whatever feeding option mom uses. And if mom wants to consider breastfeeding in the future,  gave her options of outpatient lactation services. No further questions asked at this time    Nellie Motta RN, Lactation Educator

## 2019-10-13 NOTE — PLAN OF CARE
VSS-BP is 140/90's-this is pt's normal BP, she denies S&S of PIH, HA, epigastric pain, or visual disturbances.  Fundus firm with no free flow or clots.  Staples removed from incision and new steri strips applied.  Pt's milk is in.  Pt only c/o gas pain, so was given gas X after breakfast.  Pt did have a BM today.  Enc to call with needs, questions and concerns.    D: VSS, assessments WDL.   I: Pt. received complete discharge paperwork and home medications as filled by discharge pharmacy here.  Pt. was given times of last dose for all discharge medications in writing on discharge medication sheets.  Discharge teaching included home medication, pain management, activity restrictions, postpartum cares, and signs and symptoms of infection.    A: Discharge outcomes on care plan met.  Mother states understanding and comfort with self and baby cares.  P: Pt. discharged to home.  Pt. was discharged with baby, and bands were checked at time of discharge.  Pt. was accompanied by , nurse and baby, and left with personal belongings.  Home care referral made.  Pt. to follow up with OB per MD order.  Pt. had no further questions at the time of discharge and no unmet needs were identified.

## 2019-10-13 NOTE — PROGRESS NOTES
October 13, 2019  Caesarian Section Daily Progress NOTE - PostOp DAY# 3    SUBJECTIVE:     Pain controlled? Yes  Tolerating a regular diet? Yes  Ambulating? Yes  Voiding without difficulty? Yes  Lochia? minimal      OBJECTIVE:  Vitals:    10/12/19 0742 10/12/19 1545 10/13/19 0218 10/13/19 0828   BP: 137/83 130/74 (!) 143/83 (!) 146/93   Pulse: 66 76     Resp: 16 16 16 18   Temp: 97.9  F (36.6  C) 98.1  F (36.7  C) 98.3  F (36.8  C) 98  F (36.7  C)   TempSrc: Oral Oral Oral Oral   SpO2:       Weight:       Height:           Constitutional: Healthy, alert and in no distress.   Abdomen:       Uterine fundus is firm, non-tender and at the level of the umbilicus  Incision:          Clean, Dry and Intact      LABS:  Hemoglobin   Date Value Ref Range Status   10/11/2019 12.7 11.7 - 15.7 g/dL Final   10/10/2019 13.2 11.7 - 15.7 g/dL Final         ASSESSMENT:   Post Op Day: 3  Repeat LTCS    Doing well.  Normal healing wound.  Pain well-controlled.  CHTN - on labetalol and nifedipine, BP's intermittently mildly elevated but not consistent enough to change dosages on meds       PLAN:      Continue routine care  Home later today, will follow BP at home and call if elevated        Misty Finch MD

## 2019-10-13 NOTE — PLAN OF CARE
Vital signs stable. Postpartum assessment WDL. Incision WDL with staples & steris. Pain controlled with tylenol, motrin, & 5mg oxycodone. Patient ambulating with no assist. Patient reports passing gas. Bottle feeding EBM on cue with no assist. Patient and infant bonding well. Will continue with current plan of care.

## 2019-10-13 NOTE — PLAN OF CARE
Vital signs stable. Postpartum assessment WDL. Incision well approximated, no drainage, open to air. Pain controlled with Tylenol and ibuprofen. Patient ambulating in room independently; encouraged frequent voiding. Patient reports passing gas. Pumping 35-40ml colostrum. Patient and infant bonding well. Will continue with current plan of care.

## 2019-10-18 NOTE — DISCHARGE SUMMARY
[facility] Discharge Summary    Hilda Ghosh MRN# [id number]   Age: 34 year old YOB: 1985     Date of Admission:  10/10/2019  Date of Discharge::  10/13/2019 12:42 PM  Admitting Physician:  Angelica Rubin MD  Discharge Physician:  Angelica Rubin MD     Home clinic: Obstetrics, Gynecology, and Infertility          Admission Diagnoses:   1. Intrauterine pregnancy at 37w0d   2. Chronic hypertension with worsening BP's  3. Previous  section, desires repeat  4. Infertility: IVF pregnancy          Discharge Diagnosis:   1.  delivery at 37w0d   2. 2. Chronic hypertension with worsening BP's  3. Previous  section, desires repeat  4. Infertility: IVF pregnancy            Procedures:   Repeat low transverse  section via pfannenstiel skin incision with 2 layer uterine closure           Medications Prior to Admission:     Labetolol 200mg tid  Prenatal  Vitamin  Citalopram 20mg daily  Lamictal 100mg daily          Discharge Medications:     Discharge Medication List as of 10/13/2019 11:19 AM      START taking these medications    Details   ibuprofen (ADVIL/MOTRIN) 800 MG tablet Take 1 tablet (800 mg) by mouth every 6 hours as needed for other (cramping), Disp-30 tablet, R-0, E-Prescribe      NIFEdipine ER OSMOTIC (ADALAT CC) 30 MG 24 hr tablet Take 1 tablet (30 mg) by mouth daily, Disp-60 tablet, R-0, E-Prescribe         CONTINUE these medications which have CHANGED    Details   oxyCODONE (ROXICODONE) 5 MG tablet Take 1-2 tablets (5-10 mg) by mouth every 3 hours as needed, Disp-12 tablet, R-0, Local Print         CONTINUE these medications which have NOT CHANGED    Details   citalopram (CELEXA) 40 MG tablet Take 40 mg by mouth daily, Historical      labetalol (NORMODYNE) 200 MG tablet Take 200 mg by mouth 2 times daily, Historical      LamoTRIgine (LAMICTAL PO) Take 100 mg by mouth daily , Historical      Prenatal Vit-Fe Fumarate-FA (PRENATAL VITAMIN PO) Take 1 tablet  by mouth daily, Historical         STOP taking these medications       aspirin 81 MG EC tablet Comments:   Reason for Stopping:                      Brief History of Labor or Admission:   iHlda Ghosh is a 34 year old  who was admitted at 37w0d for Repeat  section for worsening BP's with chronic hypertension.           Hospital Course:   The patient's hospital course was unremarkable.  She recovered as anticipated and experienced no post-operative complications. BP's well controlled with labetalol and nifedipine  On discharge, her pain was well controlled. Vaginal bleeding is similar to peak menstrual flow.  Voiding without difficulty.  Ambulating well and tolerating a normal diet.  No fever or significant wound drainage.  Breastfeeding going  well.  Infant is stable. Was in the NICU for respiratory issues for 24 hours  No bowel movement yet. Staples removed prior to discharge She was discharged on post-partum day #3.     Post-partum hemoglobin:   Hemoglobin   Date Value Ref Range Status   10/11/2019 12.7 11.7 - 15.7 g/dL Final             Discharge Instructions and Follow-Up:   Discharge diet: Regular   Discharge activity: No heavy lifting, pushing, pulling for 6 week(s)  No driving or operating machinery while on narcotic analgesics  Pelvic rest: abstain from intercourse and do not use tampons for 6 week(s)   Discharge follow-up: Incision check in 2 weeks  Routine postpartum visit in 6 weeks   Wound care: Keep wound clean and dry  May shower but do not soak incision or scrub  Steri-strips off in 7 days             Discharge Disposition:   Discharged to home      MD MATT Ellsworth

## 2019-11-08 ENCOUNTER — HEALTH MAINTENANCE LETTER (OUTPATIENT)
Age: 34
End: 2019-11-08

## 2020-02-23 ENCOUNTER — HEALTH MAINTENANCE LETTER (OUTPATIENT)
Age: 35
End: 2020-02-23

## 2020-12-06 ENCOUNTER — HEALTH MAINTENANCE LETTER (OUTPATIENT)
Age: 35
End: 2020-12-06

## 2021-09-26 ENCOUNTER — HEALTH MAINTENANCE LETTER (OUTPATIENT)
Age: 36
End: 2021-09-26

## 2022-01-15 ENCOUNTER — HEALTH MAINTENANCE LETTER (OUTPATIENT)
Age: 37
End: 2022-01-15

## 2023-04-22 ENCOUNTER — HEALTH MAINTENANCE LETTER (OUTPATIENT)
Age: 38
End: 2023-04-22

## 2023-09-13 ENCOUNTER — OFFICE VISIT (OUTPATIENT)
Dept: URGENT CARE | Facility: URGENT CARE | Age: 38
End: 2023-09-13
Payer: COMMERCIAL

## 2023-09-13 VITALS
TEMPERATURE: 98 F | SYSTOLIC BLOOD PRESSURE: 120 MMHG | DIASTOLIC BLOOD PRESSURE: 78 MMHG | RESPIRATION RATE: 20 BRPM | HEART RATE: 78 BPM | OXYGEN SATURATION: 98 %

## 2023-09-13 DIAGNOSIS — L29.0 RECTAL ITCHING: ICD-10-CM

## 2023-09-13 DIAGNOSIS — B95.5 PERIANAL STREPTOCOCCAL INFECTION: Primary | ICD-10-CM

## 2023-09-13 DIAGNOSIS — K62.89 PERIANAL STREPTOCOCCAL INFECTION: Primary | ICD-10-CM

## 2023-09-13 DIAGNOSIS — K64.4 EXTERNAL HEMORRHOIDS: ICD-10-CM

## 2023-09-13 PROCEDURE — 99203 OFFICE O/P NEW LOW 30 MIN: CPT | Performed by: FAMILY MEDICINE

## 2023-09-13 RX ORDER — HYDROCORTISONE 25 MG/G
CREAM TOPICAL 2 TIMES DAILY PRN
Qty: 30 G | Refills: 0 | Status: SHIPPED | OUTPATIENT
Start: 2023-09-13 | End: 2024-05-27

## 2023-09-13 RX ORDER — NYSTATIN 100000 U/G
CREAM TOPICAL 2 TIMES DAILY
Qty: 60 G | Refills: 0 | Status: SHIPPED | OUTPATIENT
Start: 2023-09-13 | End: 2024-05-27

## 2023-09-13 NOTE — PROGRESS NOTES
SUBJECTIVE:  Chief Complaint   Patient presents with    Urgent Care     Rectal pain pt wants strep rectal      Hilda Ghosh is a 38 year old female who presents with a chief complaint of rectal pain, concern of strep infection.    Took Penicillin back in June for strep infection in rectal area.  This was thru [facility].    Did improve while on antibiotic but feels that did resolve completely.  Still itchiness in area, has been using lidocaine and triamcinolone cream that was given at Urgent Care.    Denies any sore throat.     Past Medical History:   Diagnosis Date    Anxiety     Depression     Depressive disorder     on meds    Fibroid     History of blood transfusion     Blood transfusion x2 in 10/2017 for post-partum anemia (denies PPH)    History of miscarriage     Hypertension     Chronic HTN    Infertility, female      Current Outpatient Medications   Medication Sig Dispense Refill    citalopram (CELEXA) 40 MG tablet Take 40 mg by mouth daily      ibuprofen (ADVIL/MOTRIN) 800 MG tablet Take 1 tablet (800 mg) by mouth every 6 hours as needed for other (cramping) 30 tablet 0    labetalol (NORMODYNE) 200 MG tablet Take 200 mg by mouth 2 times daily      LamoTRIgine (LAMICTAL PO) Take 100 mg by mouth daily       NIFEdipine ER OSMOTIC (ADALAT CC) 30 MG 24 hr tablet Take 1 tablet (30 mg) by mouth daily 60 tablet 0    oxyCODONE (ROXICODONE) 5 MG tablet Take 1-2 tablets (5-10 mg) by mouth every 3 hours as needed 12 tablet 0    Prenatal Vit-Fe Fumarate-FA (PRENATAL VITAMIN PO) Take 1 tablet by mouth daily       Social History     Tobacco Use    Smoking status: Former     Packs/day: 0.00     Types: Cigarettes    Smokeless tobacco: Never   Substance Use Topics    Alcohol use: Not Currently       ROS:  Review of systems negative except as stated above.    EXAM:   /78   Pulse 78   Temp 98  F (36.7  C)   Resp 20   SpO2 98%   GENERAL APPEARANCE: healthy, alert and no distress  RECTAL: small flat  hemorroids, not thrombosed, faint irregular pink patchiness perirectally, no pustules, no drainage  PSYCH:alert, affect bright      ASSESSMENT/PLAN:  (K62.89,  B95.5) Perianal streptococcal infection  (primary encounter diagnosis)  Plan: amoxicillin-clavulanate (AUGMENTIN) 875-125 MG         tablet            (K64.4) External hemorrhoids  Comment: mild  Plan: hydrocortisone, Perianal, (HYDROCORTISONE) 2.5         % cream            (L29.0) Rectal itching  Plan: nystatin (MYCOSTATIN) 369010 UNIT/GM external         cream            Reassurance given, discussed perianal strep and due to discomfort will treat with RX Augmentin for broader coverage.  Discussed small hemorrhoid and treatment with RX Anusol cream to help with symptoms.  Discussed probable yeast/fungal overgrowth with use of antibiotic and steroid topically in perianal area, RX nystatin cream to use to area.    Follow up with primary provider if no improvement of symptoms in 2-4 weeks    Josue Mendez MD  September 13, 2023 2:33 PM

## 2024-02-10 ENCOUNTER — HEALTH MAINTENANCE LETTER (OUTPATIENT)
Age: 39
End: 2024-02-10

## 2024-05-27 ENCOUNTER — HOSPITAL ENCOUNTER (EMERGENCY)
Facility: CLINIC | Age: 39
Discharge: HOME | End: 2024-05-27
Attending: SOCIAL WORKER | Admitting: SOCIAL WORKER
Payer: COMMERCIAL

## 2024-05-27 VITALS
WEIGHT: 195.3 LBS | RESPIRATION RATE: 18 BRPM | OXYGEN SATURATION: 99 % | SYSTOLIC BLOOD PRESSURE: 141 MMHG | HEART RATE: 90 BPM | TEMPERATURE: 98.8 F | BODY MASS INDEX: 29.6 KG/M2 | HEIGHT: 68 IN | DIASTOLIC BLOOD PRESSURE: 87 MMHG

## 2024-05-27 DIAGNOSIS — F41.1 GAD (GENERALIZED ANXIETY DISORDER): ICD-10-CM

## 2024-05-27 DIAGNOSIS — F90.9 ATTENTION DEFICIT HYPERACTIVITY DISORDER (ADHD), UNSPECIFIED ADHD TYPE: ICD-10-CM

## 2024-05-27 DIAGNOSIS — F33.1 MAJOR DEPRESSIVE DISORDER, RECURRENT EPISODE, MODERATE (H): ICD-10-CM

## 2024-05-27 PROCEDURE — 99284 EMERGENCY DEPT VISIT MOD MDM: CPT | Performed by: NURSE PRACTITIONER

## 2024-05-27 PROCEDURE — 99284 EMERGENCY DEPT VISIT MOD MDM: CPT

## 2024-05-27 PROCEDURE — 250N000013 HC RX MED GY IP 250 OP 250 PS 637: Performed by: NURSE PRACTITIONER

## 2024-05-27 RX ORDER — POLYETHYLENE GLYCOL 3350 17 G
4 POWDER IN PACKET (EA) ORAL
Status: DISCONTINUED | OUTPATIENT
Start: 2024-05-27 | End: 2024-05-27 | Stop reason: HOSPADM

## 2024-05-27 RX ORDER — LISDEXAMFETAMINE DIMESYLATE 20 MG/1
20 CAPSULE ORAL DAILY PRN
COMMUNITY

## 2024-05-27 RX ORDER — GUANFACINE 1 MG/1
1 TABLET ORAL 2 TIMES DAILY
COMMUNITY

## 2024-05-27 RX ORDER — LISDEXAMFETAMINE DIMESYLATE 50 MG/1
50 CAPSULE ORAL EVERY MORNING
COMMUNITY

## 2024-05-27 RX ORDER — VILAZODONE HYDROCHLORIDE 10 MG/1
10 TABLET ORAL DAILY
Qty: 7 TABLET | Refills: 0 | Status: SHIPPED | OUTPATIENT
Start: 2024-05-27

## 2024-05-27 RX ORDER — GABAPENTIN 100 MG/1
100-300 CAPSULE ORAL 3 TIMES DAILY PRN
Qty: 90 CAPSULE | Refills: 0 | Status: SHIPPED | OUTPATIENT
Start: 2024-05-27

## 2024-05-27 RX ORDER — LISDEXAMFETAMINE DIMESYLATE 20 MG/1
20 CAPSULE ORAL ONCE
Status: COMPLETED | OUTPATIENT
Start: 2024-05-27 | End: 2024-05-27

## 2024-05-27 RX ORDER — CITALOPRAM HYDROBROMIDE 20 MG/1
10 TABLET ORAL DAILY
Status: SHIPPED
Start: 2024-05-27

## 2024-05-27 RX ORDER — VILAZODONE HYDROCHLORIDE 20 MG/1
TABLET ORAL
Qty: 30 TABLET | Refills: 0 | Status: SHIPPED | OUTPATIENT
Start: 2024-05-27

## 2024-05-27 RX ADMIN — LISDEXAMFETAMINE 20 MG: 20 CAPSULE ORAL at 15:20

## 2024-05-27 RX ADMIN — NICOTINE POLACRILEX 4 MG: 2 LOZENGE ORAL at 14:31

## 2024-05-27 ASSESSMENT — ACTIVITIES OF DAILY LIVING (ADL)
ADLS_ACUITY_SCORE: 35

## 2024-05-27 ASSESSMENT — COLUMBIA-SUICIDE SEVERITY RATING SCALE - C-SSRS
1. IN THE PAST MONTH, HAVE YOU WISHED YOU WERE DEAD OR WISHED YOU COULD GO TO SLEEP AND NOT WAKE UP?: NO
2. HAVE YOU ACTUALLY HAD ANY THOUGHTS OF KILLING YOURSELF IN THE PAST MONTH?: NO
6. HAVE YOU EVER DONE ANYTHING, STARTED TO DO ANYTHING, OR PREPARED TO DO ANYTHING TO END YOUR LIFE?: NO

## 2024-05-27 NOTE — ED NOTES
Discharge instructions reviewed with patient including follow-up care plan. Educated on medication regime and advised not to stop prescribed medication without consulting their physician. Reviewed safety plan and outpatient resources/appointments.Verbalized understanding of discharge instructions. Denies SI. All belongings which where brought into the hospital have been returned to patient. Escorted off the unit @ 1650. Discharged to self.

## 2024-05-27 NOTE — ED TRIAGE NOTES
Pt complains of worsening anxiety for the last several days along with trouble eating and sleeping.  Pt denies SI, ETOH, or drug use.  [facility]     Triage Assessment (Adult)       Row Name 05/27/24 1119          Triage Assessment    Airway WDL WDL        Respiratory WDL    Respiratory WDL WDL        Skin Circulation/Temperature WDL    Skin Circulation/Temperature WDL WDL        Cardiac WDL    Cardiac WDL WDL        Cognitive/Neuro/Behavioral WDL    Cognitive/Neuro/Behavioral WDL WDL

## 2024-05-27 NOTE — ED PROVIDER NOTES
"  Emergency Department Note      History of Present Illness     Chief Complaint  Anxiety and Psychiatric Evaluation    HPI  Hilda Ghosh is a 38 year old female with a history of anxiety and depression who presents the emergency department with a chief complaint of worsening anxiety and depression.  Patient notes numerous stressors recently that felt overwhelming.  She denies any thoughts of hurting herself or others, no auditory visual hallucinations.    Independent Historian  None    Review of External Notes  None  Past Medical History   Medical History and Problem List  Past Medical History:   Diagnosis Date    Anxiety     Depression     Depressive disorder     Fibroid     History of blood transfusion     History of miscarriage     Hypertension     Infertility, female        Medications  citalopram (CELEXA) 40 MG tablet  hydrocortisone, Perianal, (HYDROCORTISONE) 2.5 % cream  ibuprofen (ADVIL/MOTRIN) 800 MG tablet  labetalol (NORMODYNE) 200 MG tablet  LamoTRIgine (LAMICTAL PO)  NIFEdipine ER OSMOTIC (ADALAT CC) 30 MG 24 hr tablet  nystatin (MYCOSTATIN) 169833 UNIT/GM external cream  oxyCODONE (ROXICODONE) 5 MG tablet  Prenatal Vit-Fe Fumarate-FA (PRENATAL VITAMIN PO)        Surgical History   Past Surgical History:   Procedure Laterality Date     SECTION       SECTION N/A 10/10/2019    Procedure: REPEAT  SECTION;  Surgeon: Angelica Rubin MD;  Location:  L+D    DILATION AND CURETTAGE SUCTION N/A 2016    Procedure: DILATION AND CURETTAGE SUCTION;  Surgeon: Osmany Ledezma MD;  Location:  SD    finger reattachment      left ring     GYN SURGERY      D&C      Physical Exam   Patient Vitals for the past 24 hrs:   BP Temp Temp src Pulse Resp SpO2 Height Weight   24 1118 (!) 145/59 98  F (36.7  C) Temporal 90 18 97 % 1.727 m (5' 8\") 88.5 kg (195 lb)     Physical Exam  General: Overall stable and nontoxic appearing, resting comfortably  HEENT: Conjunctivae clear, no " scleral icterus, mucous membranes moist  Neuro: Alert, moving all extremities equally with intention  Respiratory: No respiratory distress, easy work of breathing  Abd: No distention  MSK: No diaphoresis   Psych: Appropriate, interactive, tearful at times      Diagnostics   Lab Results   Labs Ordered and Resulted from Time of ED Arrival to Time of ED Departure - No data to display    Imaging  No orders to display     Independent Interpretation  None  ED Course    Medications Administered  Medications - No data to display    Procedures  Procedures     Discussion of Management  None    Social Determinants of Health adding to complexity of care  None    ED Course     Medical Decision Making / Diagnosis   CMS Diagnoses: None    MIPS     None    MDM  Hilda Ghosh is a 38 year old female with above history who presents the emergency department with acutely worsening anxiety and stress. On exam, patient is overall stable without any signs of toxicity.  Based on the presentation, I feel that most likely patient's symptoms are related to underlying mental health and behavioral health.  Doubt organic medical etiology, at this time no indication for additional medical workup.  Patient is cleared for transfer to [facility].     Disposition  The patient was transferred to [facility].     ICD-10 Codes:    ICD-10-CM    1. Anxiety  F41.9            Discharge Medications  New Prescriptions    No medications on file         MD Sharan Estrada Connie, MD  05/27/24 7736

## 2024-05-27 NOTE — CONSULTS
Diagnostic Evaluation Consultation  Crisis Assessment    Patient Name: Hilda Ghosh  Age:  38 year old  Legal Sex: female  Gender Identity: female  Pronouns:   Race: White  Ethnicity: Not  or   Language: English      Patient was assessed: In person      Patient location: [facility] EMERGENCY DEPT                             EMP12    Referral Data and Chief Complaint  Hilda Ghosh presents to the ED by  self. Patient is presenting to the ED for the following concerns: Anxiety, Depression, Worsening psychosocial stress.   Factors that make the mental health crisis life threatening or complex are:  Patient is  from her  and splitting time with the two kids..      Informed Consent and Assessment Methods  Explained the crisis assessment process, including applicable information disclosures and limits to confidentiality, assessed understanding of the process, and obtained consent to proceed with the assessment.  Assessment methods included conducting a formal interview with patient, review of medical records, collaboration with medical staff, and obtaining relevant collateral information from family and community providers when available.  : done     Patient response to interventions: acceptance expressed, verbalizes understanding  Coping skills were attempted to reduce the crisis:  Coming to the ED for help.     History of the Crisis   Patient presents to [facility] ED and [facility] for concerns of increased anxiety over the past few days. patient has prior diagnosis of MDD, GRANT, and there is a mention of Bipolar 2 although patient pushed back on this. Patient has been  from her  for the past 2.5 years (2021). They have two children, a boy and girl ages 4 and 6. 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 Patient endorses a loss of apetite and poor sleep lately, citing 3-5 hours of sleep nightly, and eating is on and off.  Patient endorses  difficulties  "concentrating, racing thoughts, excessive worry, sadness, hopelessness at times, excessive guilt, and shame. Patient insists she is not suicidal and never has been, and denies any SIB. When she is with her kids she feels she has a short fuse and endorses \"lots of mom stuff\". Patient has engaged with the [facility] Program after her last birth and has done DBT through that. She is open to EMDR as well. Patient is prescribed Citalopram 40mg, Lamictal, Guanfacine, and Vyvannse 25/50 mg, and is current with medications. SHe has a PCP in [place], [address] [place] [address],  [phone]. She gets medication management through [facility] (not since February), and a therapist in Sarah Alberto MS LMFT of [facility], [address] [place] [address] [address]  [place] [id number], [phone]. Patient has not been seen emergently previously save for a 11/26/18 presentation at [facility] for MDD and SI. Patient denies being suicidal and states she has never been.    Brief Psychosocial History  Family:  , Children yes  Support System:  Parent(s), Friend  Employment Status:  employed full-time  Source of Income:  salary/wages  Financial Environmental Concerns:  none  Current Hobbies:  television/movies/videos  Barriers in Personal Life:  emotional concerns, mental health concerns    Significant Clinical History  Current Anxiety Symptoms:  racing thoughts, excessive worry, anxious  Current Depression/Trauma:  difficulty concentrating, negativistic, crying or feels like crying, low self esteem, irritable, helplessness, hopelessness, sadness, excessive guilt  Current Somatic Symptoms:  racing thoughts, excessive worry, anxious  Current Psychosis/Thought Disturbance:  forgetful, agitation  Current Eating Symptoms:  loss of appetite  Chemical Use History:  Alcohol: None  Benzodiazepines: None  Opiates: None  Cocaine: None  Marijuana: None  Other Use: None  Withdrawal Symptoms:  (None " noted.)  Addictions:  (None noted.)   Past diagnosis:  Anxiety Disorder, Depression  Family history:  No known history of mental health or chemical health concerns  Past treatment:  Individual therapy, Psychiatric Medication Management, Primary Care  Details of most recent treatment:  Ongoing med mgmt and therapy  Other relevant history:  None noted.       Collateral Information  Is there collateral information: No (None noted.)        Additional collateral information:        Risk Assessment  [place] Suicide Severity Rating Scale Full Clinical Version:  Suicidal Ideation  Q1 Wish to be Dead (Lifetime): No  Q2 Non-Specific Active Suicidal Thoughts (Lifetime): No  Q6 Suicide Behavior (Lifetime): no     Suicidal Behavior (Lifetime)  Actual Attempt (Lifetime): No  Has subject engaged in non-suicidal self-injurious behavior? (Lifetime): No  Interrupted Attempts (Lifetime): No  Aborted or Self-Interrupted Attempt (Lifetime): No  Preparatory Acts or Behavior (Lifetime): No    [place] Suicide Severity Rating Scale Recent:   Suicidal Ideation (Recent)  Q1 Wished to be Dead (Past Month): no  Q2 Suicidal Thoughts (Past Month): no  Level of Risk per Screen: no risks indicated  Intensity of Ideation (Recent)  Most Severe Ideation Rating (Past 1 Month):  (None noted.)  Description of Most Severe Ideation (Past 1 Month): None noted.  Frequency (Past 1 Month):  (None noted.)  Duration (Past 1 Month):  (None noted.)  Controllability (Past 1 Month): Does not attempt to control thoughts (None noted.)  Deterrents (Past 1 Month): Does not apply  Reasons for Ideation (Past 1 Month): Does not apply  Suicidal Behavior (Recent)  Actual Attempt (Past 3 Months): No  Total Number of Actual Attempts (Past 3 Months): 0  Actual Attempt Description (Past 3 Months): None noted.  Has subject engaged in non-suicidal self-injurious behavior? (Past 3 Months): No  Interrupted Attempts (Past 3 Months): No  Total Number of Interrupted Attempts (Past 3  Months): 0  Interrupted Attempt Description (Past 3 Months): None noted.  Aborted or Self-Interrupted Attempt (Past 3 Months): No  Total Number of Aborted or Self-Interrupted Attempts (Past 3 Months): 0  Aborted or Self-Interrupted Attempt Description (Past 3 Months): None noted.  Preparatory Acts or Behavior (Past 3 Months): No  Total Number of Preparatory Acts (Past 3 Months): 0  Preparatory Acts or Behavior Description (Past 3 Months): None noted.    Environmental or Psychosocial Events: loss of a relationship due to divorce/separation, challenging interpersonal relationships, helplessness/hopelessness, other life stressors  Protective Factors: Protective Factors: strong bond to family unit, community support, or employment, responsibilities and duties to others, including pets and children, lives in a responsibly safe and stable environment, good treatment engagement, sense of importance of health and wellness, able to access care without barriers, supportive ongoing medical and mental health care relationships, help seeking, good impulse control, good problem-solving, coping, and conflict resolution skills, optimistic outlook - identification of future goals, reality testing ability, constructive use of leisure time, enjoyable activities, resilience    Does the patient have thoughts of harming others? Feels Like Hurting Others: no  Previous Attempt to Hurt Others: no  Current presentation:  (Calm, cooperative, conversational.)  Is the patient engaging in sexually inappropriate behavior?: no    Is the patient engaging in sexually inappropriate behavior?  no        Mental Status Exam   Affect: Labile (Mildly labile is assessment, teary)  Appearance: Appropriate  Attention Span/Concentration: Attentive  Eye Contact: Variable, Engaged    Fund of Knowledge: Appropriate   Language /Speech Content: Fluent  Language /Speech Volume: Normal  Language /Speech Rate/Productions: Normal  Recent Memory: Intact  Remote Memory:  "Intact  Mood: Sad, Anxious  Orientation to Person: Yes   Orientation to Place: Yes  Orientation to Time of Day: Yes  Orientation to Date: Yes     Situation (Do they understand why they are here?): Yes  Psychomotor Behavior: Normal  Thought Content: Clear  Thought Form: Intact      Medication  Psychotropic medications:   Medication Orders - Psychiatric (From admission, onward)      Start     Dose/Rate Route Frequency Ordered Stop    24 1355  lisdexamfetamine (VYVANSE) capsule 20 mg         20 mg Oral ONCE 24 1350               Current Care Team  Patient Care Team:  Елена Juan MD as PCP - General (Family Practice)    Diagnosis  Patient Active Problem List   Diagnosis Code    Hypertension I10    Indication for care in labor or delivery O75.9    Postpartum care following  delivery Z39.2    Major depressive disorder, recurrent episode, moderate (H) F33.1    GRANT (generalized anxiety disorder) F41.1       Primary Problem This Admission  Active Hospital Problems    Major depressive disorder, recurrent episode, moderate (H)      *GRANT (generalized anxiety disorder)        Clinical Summary and Substantiation of Recommendations   Patient presents with heightened anxiety concerns and moderate depression. Patient has had the same therapist for the past ten years. She has also had the same medication management for some time, and is currently seeking new providers for \"fresh eyes\". Patient denies being suicidal, homicidal, self injurious, and denies any AH/VH, and is advocating for discharge following being provided information for continued med mgmt and therapy.      Patient coping skills attempted to reduce the crisis:  Coming to the ED for help.    Disposition  Recommended disposition: Individual Therapy, Medication Management        Reviewed case and recommendations with attending provider. Attending Name: FRANDY Ramirez       Attending concurs with disposition: yes       Patient and/or " validated legal guardian concurs with disposition:   yes       Final disposition:  discharge    Legal status on admission: Voluntary/Patient has signed consent for treatment    Assessment Details   Total duration spent with the patient: 30 min     CPT code(s) utilized: 54154 - Psychotherapy for Crisis - 60 (30-74*) min    Bi George MA Psychotherapist  [facility] - Triage & Transition Services  Callback: [phone]

## 2024-05-27 NOTE — PROGRESS NOTES
Triage & Transition Services, Extended Care     Client Name: Hilda Ghosh    Date: May 27, 2024    Patient was seen    Mode of Assessment:      Service Type: (P) excused from group session (Pt met with LMHP for initial assessment during group.)  Session Start Time:  (P) 1325    Session End Time: (P) 1355  Session Length: (P) 30  Site Location: [facility] EMERGENCY DEPT                             EMP12  Total Number ofAttendees: (P) 4  Topic:   (P) structured socialization, self-care activities, emotions/expression   Response:       Albina Mccloud   Licensed Mental Health Professional (LMHP), Extended Care  [phone]

## 2024-05-27 NOTE — ED NOTES
38 year old female with history of anxiety received from ED due to increasing symptoms of anxiety, medication changes. Reports that she recently experienced a breakup on Friday and has had an increasingly hard time managing symptoms of anxiety. Pt reports that she has been feeling overstimulated, crying often, not eating or sleeping well. Pt states that she talked to her prescriber who decreased her citalopram in hopes that she would feel less stimulated. Denies SI/HI. Nursing and risk assessments completed. Assessments reviewed with LMHP and physician. Admission information reviewed with patient. Patient given a tour of [facility] and instructions on using the facility. Questions regarding [facility] addressed. Pt safety search completed.  Pt informed that video monitoring is in progress.

## 2024-05-27 NOTE — ED PROVIDER NOTES
"[facility] Unit - Psychiatric Consultation  [facility] Emergency Department    Hilda Ghosh MRN: [id number]   Age: 38 year old YOB: 1985     History     Chief Complaint   Patient presents with    Anxiety    Psychiatric Evaluation     HPI  Hilda Gohsh is a 38 year old female with history notable for anxiety, depression, and ADHD. Patient presented to the emergency department for evaluation of worsening anxiety, insomnia, and appetite. Patient was medically evaluated in the emergency department and determined to be medically stable for transfer to [facility] for further psychiatric assessment. Patient is nearing 4.5 hours in emergency care. Here at [facility], patient endorses feeling somewhat uncomfortable in the open milieu, was hoping to have some time to be by herself. She reports that she had been dating a man for the past four months who unexpectedly broke up with her on Friday. Patient reports she then reached out to her ex-, who told her that he has been telling people that he is a \"single parent,\" which led patient to feel worse about herself as they share custody and parenting duties. Patient reports that she tends to look toward relationships as a way to fill a void and emptiness within herself. Talks about feeling as though she has not been a good mother due to being short and irritable often with her children. She conceived via IVF and sometimes wonders if the universe was trying to tell her something when she was not able to conceive naturally. She has been feeling stressed and overwhelmed often. Recently, she has only been sleeping around 3-5 hours per night. Appetite has been lower than usual. Has been taking citalopram for the past couple of years, up to a dose of 40 mg. The 40 mg dose was not effective for anxiety symptoms and dose was reduced to 20 mg, and still not helping. Recalls having trialed sertraline prior to this, which had not been effective, and experienced withdrawal " symptoms if missing a dose. She has also been prescribed lamotrigine for mood stabilization and questions its utility. Patient is interested in a medication change. She denies suicidal thoughts, plans, or intent. No evidence of homicidal thoughts, psychosis, or chana. Patient anticipates discharge home today.     Past Medical History  Past Medical History:   Diagnosis Date    Anxiety     Depression     Depressive disorder     on meds    Fibroid     History of blood transfusion     Blood transfusion x2 in 10/2017 for post-partum anemia (denies PPH)    History of miscarriage     Hypertension     Chronic HTN    Infertility, female      Past Surgical History:   Procedure Laterality Date     SECTION       SECTION N/A 10/10/2019    Procedure: REPEAT  SECTION;  Surgeon: Angelica Rubin MD;  Location:  L+D    DILATION AND CURETTAGE SUCTION N/A 2016    Procedure: DILATION AND CURETTAGE SUCTION;  Surgeon: Osmany Ledezma MD;  Location:  SD    finger reattachment      left ring     GYN SURGERY      D&C      citalopram (CELEXA) 20 MG tablet  gabapentin (NEURONTIN) 100 MG capsule  guanFACINE (TENEX) 1 MG tablet  lisdexamfetamine (VYVANSE) 20 MG capsule  lisdexamfetamine (VYVANSE) 50 MG capsule  vilazodone (VIIBRYD) 10 MG TABS tablet  vilazodone (VIIBRYD) 20 MG TABS tablet  ibuprofen (ADVIL/MOTRIN) 800 MG tablet  LamoTRIgine (LAMICTAL PO)  NIFEdipine ER OSMOTIC (ADALAT CC) 30 MG 24 hr tablet      No Known Allergies  Family History  History reviewed. No pertinent family history.  Social History   Social History     Tobacco Use    Smoking status: Former     Types: Cigarettes    Smokeless tobacco: Never   Substance Use Topics    Alcohol use: Not Currently    Drug use: Never          Review of Systems  A medically appropriate review of systems was performed with pertinent positives and negatives noted in the HPI, and all other systems negative.    Physical Examination   BP: (!)  "145/59  Pulse: 90  Temp: 98  F (36.7  C)  Resp: 18  Height: 172.7 cm (5' 8\")  Weight: 88.5 kg (195 lb)  SpO2: 97 %    Physical Exam  General: Appears stated age.   Neuro: Alert and fully oriented. Extremities appear to demonstrate normal strength on visual inspection.   Integumentary/Skin: no rash visualized, normal color    Psychiatric Examination   Appearance: awake, alert, adequately groomed, appeared as age stated, and casually dressed  Attitude:  cooperative  Eye Contact:  fair  Mood:  anxious  Affect:  mood congruent, intensity is blunted, and tearful  Speech:  clear, coherent; normal prosody  Psychomotor Behavior:  no evidence of tardive dyskinesia, dystonia, or tics  Thought Process:  linear and goal oriented  Associations:  no loose associations  Thought Content:  no evidence of suicidal ideation or homicidal ideation, no evidence of psychotic thought, no auditory hallucinations present, and no visual hallucinations present  Insight:  fair  Judgement:  fair  Oriented to:  time, person, and place  Attention Span and Concentration:  fair  Recent and Remote Memory: intact  Language: able to name/identify objects without impairment  Fund of Knowledge: intact with awareness of current and past events    ED Course        Labs Ordered and Resulted from Time of ED Arrival to Time of ED Departure - No data to display    Assessments & Plan (with Medical Decision Making)   Patient presenting with worsening symptoms of anxiety, accompanied by insomnia and decreased appetite. Symptoms exacerbated by a recent breakup on Friday. Seeking medication changes aimed at reducing anxiety and irritability. Nursing notes reviewed noting no acute issues.     I have reviewed the assessment completed by the [facility].     Preliminary diagnosis:    ICD-10-CM    1. GRANT (generalized anxiety disorder)  F41.1       2. Major depressive disorder, recurrent episode, moderate (H)  F33.1       3. Attention deficit hyperactivity disorder (ADHD), " unspecified ADHD type  F90.9            Treatment Plan:  - Decrease citalopram to 10 mg for 5 days, then discontinue  - Start vilazodone 10 mg daily, increase to 20 mg daily after 1 week to target symptoms of anxiety and depressed mood  - Trial gabapentin 100 to 300 mg tid prn for anxiety or sleep  - Continue lamotrigine 200 mg daily for mood stabilization  - Continue guanfacine 2 mg daily for treatment of ADHD symptoms, along with Vyvanse 50 mg daily for treatment of ADHD. Pt also takes a PRN dose of Vyvanse 20 mg in the afternoon. Discussed Vyvanse could increase anxiety symptoms at higher doses.   - Patient will be provided with appointments for outpatient individual psychotherapy as well as psychiatric follow-up.   - Patient to be discharged home today    After a period of working with the treatment team on the [facility] unit, the patient's mental state improved to allow a safe transition to outpatient care. After counseling on the diagnosis, work-up, and treatment plan, the patient was discharged. Close follow-up with a psychiatrist and/or therapist was recommended and community psychiatric resources were provided. Patient is to return to the ED if any urgent or potentially life-threatening concerns.     At the time of discharge, the patient's acute suicide risk was determined to be low due to the following factors: Reduction in the intensity of mood/anxiety symptoms that preceded the admission, denial of suicidal thoughts, denies feeling helpless or helpless, not currently under the influence of alcohol or illicit substances, denies experiencing command hallucinations, no immediate access to firearms. The patient's acute risk could be higher if noncompliant with their treatment plan, medications, follow-up appointments or using illicit substances or alcohol. Protective factors include: social supports, children, stable housing, employment      --  Jony Asher [facility]  DEPT  [facility] Unit      Jony Asher, CNP  05/27/24 [id number]

## 2024-05-27 NOTE — ED NOTES
[facility]  ED to EMPATH Checklist:      Goal for EMPATH: Anxiety management    Current Behavior: Anxious and Cooperative    Safety Concerns: None    Legal Hold Status: Voluntary    Medically Cleared by ED provider: Yes    Patient Therapeutically Searched: Not searched - Currently in triage    Belongings: Remain with patient    Independent Ambulation at Baseline: Yes/No: Yes    Participates in Care/Conversation: Yes/No: Yes    Patient Informed about EMPATH: Yes/No: Yes    DEC: Not ordered    Patient Ready to be Transferred to EMPATH? Yes/No: Yes

## 2024-06-29 ENCOUNTER — HEALTH MAINTENANCE LETTER (OUTPATIENT)
Age: 39
End: 2024-06-29

## 2025-07-13 ENCOUNTER — HEALTH MAINTENANCE LETTER (OUTPATIENT)
Age: 40
End: 2025-07-13

## (undated) DEVICE — SU VICRYL 3-0 CT-1 36" J338H

## (undated) DEVICE — PACK C-SECTION LF PL15OTA83B

## (undated) DEVICE — SU VICRYL 0 CT 36" J358H

## (undated) DEVICE — GLOVE PROTEXIS BLUE W/NEU-THERA 6.5  2D73EB65

## (undated) DEVICE — DRSG STERI STRIP 1/4X3" R1541

## (undated) DEVICE — BLADE CLIPPER 4406

## (undated) DEVICE — PREP CHLORAPREP 26ML TINTED ORANGE  260815

## (undated) DEVICE — ESU GROUND PAD UNIVERSAL W/O CORD

## (undated) DEVICE — SU VICRYL 0 CT-1 27" J340H

## (undated) DEVICE — SOL NACL 0.9% IRRIG 1000ML BOTTLE 07138-09

## (undated) DEVICE — CATH TRAY FOLEY 16FR BARDEX W/DRAIN BAG STATLOCK 300316A

## (undated) DEVICE — SU CHROMIC 3-0 CT-1 27" 810H

## (undated) DEVICE — GLOVE PROTEXIS W/NEU-THERA 6.0  2D73TE60

## (undated) DEVICE — LINEN C-SECTION 5415

## (undated) DEVICE — SUCTION CANISTER MEDIVAC LINER 3000ML W/LID 65651-530

## (undated) DEVICE — STPL SKIN PROXIMATE 35 WIDE PMW35

## (undated) DEVICE — DRSG TELFA 3X8" 1238

## (undated) DEVICE — DRSG GAUZE 4X4" TOPPER